# Patient Record
Sex: FEMALE | Race: ASIAN | NOT HISPANIC OR LATINO
[De-identification: names, ages, dates, MRNs, and addresses within clinical notes are randomized per-mention and may not be internally consistent; named-entity substitution may affect disease eponyms.]

---

## 2020-07-10 ENCOUNTER — APPOINTMENT (OUTPATIENT)
Dept: INTERNAL MEDICINE | Facility: CLINIC | Age: 35
End: 2020-07-10
Payer: MEDICAID

## 2020-07-10 VITALS
RESPIRATION RATE: 16 BRPM | HEIGHT: 59 IN | SYSTOLIC BLOOD PRESSURE: 141 MMHG | OXYGEN SATURATION: 99 % | BODY MASS INDEX: 27.82 KG/M2 | HEART RATE: 107 BPM | TEMPERATURE: 98.3 F | WEIGHT: 138 LBS | DIASTOLIC BLOOD PRESSURE: 95 MMHG

## 2020-07-10 VITALS — SYSTOLIC BLOOD PRESSURE: 125 MMHG | DIASTOLIC BLOOD PRESSURE: 87 MMHG

## 2020-07-10 DIAGNOSIS — Z82.49 FAMILY HISTORY OF ISCHEMIC HEART DISEASE AND OTHER DISEASES OF THE CIRCULATORY SYSTEM: ICD-10-CM

## 2020-07-10 DIAGNOSIS — G47.00 INSOMNIA, UNSPECIFIED: ICD-10-CM

## 2020-07-10 DIAGNOSIS — F42.9 OBSESSIVE-COMPULSIVE DISORDER, UNSPECIFIED: ICD-10-CM

## 2020-07-10 DIAGNOSIS — Z00.00 ENCOUNTER FOR GENERAL ADULT MEDICAL EXAMINATION W/OUT ABNORMAL FINDINGS: ICD-10-CM

## 2020-07-10 PROCEDURE — 99202 OFFICE O/P NEW SF 15 MIN: CPT

## 2020-07-10 PROCEDURE — 99385 PREV VISIT NEW AGE 18-39: CPT | Mod: 25

## 2020-07-12 PROBLEM — F42.9 OCD (OBSESSIVE COMPULSIVE DISORDER): Status: ACTIVE | Noted: 2020-07-10

## 2020-07-12 PROBLEM — G47.00 INSOMNIA, UNSPECIFIED TYPE: Status: ACTIVE | Noted: 2020-07-10

## 2020-07-12 NOTE — PHYSICAL EXAM
[No Acute Distress] : no acute distress [Well Nourished] : well nourished [Well Developed] : well developed [Well-Appearing] : well-appearing [Normal Sclera/Conjunctiva] : normal sclera/conjunctiva [PERRL] : pupils equal round and reactive to light [EOMI] : extraocular movements intact [Normal Outer Ear/Nose] : the outer ears and nose were normal in appearance [Normal Oropharynx] : the oropharynx was normal [No JVD] : no jugular venous distention [No Lymphadenopathy] : no lymphadenopathy [Supple] : supple [No Respiratory Distress] : no respiratory distress  [No Accessory Muscle Use] : no accessory muscle use [Clear to Auscultation] : lungs were clear to auscultation bilaterally [Normal Rate] : normal rate  [Regular Rhythm] : with a regular rhythm [Normal S1, S2] : normal S1 and S2 [No Murmur] : no murmur heard [Pedal Pulses Present] : the pedal pulses are present [No Edema] : there was no peripheral edema [Soft] : abdomen soft [Non Tender] : non-tender [Non-distended] : non-distended [Normal Bowel Sounds] : normal bowel sounds [Normal Posterior Cervical Nodes] : no posterior cervical lymphadenopathy [Normal Anterior Cervical Nodes] : no anterior cervical lymphadenopathy [No CVA Tenderness] : no CVA  tenderness [No Spinal Tenderness] : no spinal tenderness [No Joint Swelling] : no joint swelling [Grossly Normal Strength/Tone] : grossly normal strength/tone [No Rash] : no rash [Coordination Grossly Intact] : coordination grossly intact [No Focal Deficits] : no focal deficits [Normal Gait] : normal gait [Speech Grossly Normal] : speech grossly normal [Memory Grossly Normal] : memory grossly normal [Normal Affect] : the affect was normal [Alert and Oriented x3] : oriented to person, place, and time [Normal Mood] : the mood was normal [Normal Insight/Judgement] : insight and judgment were intact

## 2020-07-12 NOTE — HEALTH RISK ASSESSMENT
[] : Yes [No] : In the past 12 months have you used drugs other than those required for medical reasons? No [0] : 2) Feeling down, depressed, or hopeless: Not at all (0) [Patient reported PAP Smear was abnormal] : Patient reported PAP Smear was abnormal [HIV test declined] : HIV test declined [Hepatitis C test declined] : Hepatitis C test declined [None] : None [With Significant Other] : lives with significant other [Unemployed] : unemployed [Significant Other] : lives with significant other [Sexually Active] : sexually active [Feels Safe at Home] : Feels safe at home [Fully functional (using the telephone, shopping, preparing meals, housekeeping, doing laundry, using] : Fully functional and needs no help or supervision to perform IADLs (using the telephone, shopping, preparing meals, housekeeping, doing laundry, using transportation, managing medications and managing finances) [Fully functional (bathing, dressing, toileting, transferring, walking, feeding)] : Fully functional (bathing, dressing, toileting, transferring, walking, feeding) [No falls in past year] : Patient reported no falls in the past year [de-identified] :  a pack every 2 weeks for 10 yrs  [[id number]] : 0 [Change in mental status noted] : No change in mental status noted [Language] : denies difficulty with language [Behavior] : denies difficulty with behavior [Learning/Retaining New Information] : denies difficulty learning/retaining new information [Handling Complex Tasks] : denies difficulty handling complex tasks [Reasoning] : denies difficulty with reasoning [Spatial Ability and Orientation] : denies difficulty with spatial ability and orientation [High Risk Behavior] : no high risk behavior [PapSmearDate] : 01/2019 [

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
## 2020-07-12 NOTE — REVIEW OF SYSTEMS
[Fever] : no fever [Chills] : no chills [Fatigue] : no fatigue [Discharge] : no discharge [Pain] : no pain [Earache] : no earache [Hearing Loss] : no hearing loss [Nasal Discharge] : no nasal discharge [Sore Throat] : no sore throat [Chest Pain] : no chest pain [Palpitations] : no palpitations [Leg Claudication] : no leg claudication [Shortness Of Breath] : no shortness of breath [Wheezing] : no wheezing [Cough] : no cough [Dyspnea on Exertion] : no dyspnea on exertion [Abdominal Pain] : no abdominal pain [Nausea] : no nausea [Constipation] : no constipation [Diarrhea] : diarrhea [Vomiting] : no vomiting [Heartburn] : no heartburn [Melena] : no melena [Dysuria] : no dysuria [Incontinence] : no incontinence [Hematuria] : no hematuria [Frequency] : no frequency [Joint Pain] : no joint pain [Joint Stiffness] : no joint stiffness [Joint Swelling] : no joint swelling [Muscle Pain] : no muscle pain [Itching] : no itching [Skin Rash] : no skin rash [Headache] : no headache [Dizziness] : no dizziness [Fainting] : no fainting [Memory Loss] : no memory loss [Unsteady Walking] : no ataxia [Suicidal] : not suicidal [Insomnia] : insomnia [Anxiety] : anxiety [Depression] : depression

## 2020-07-15 ENCOUNTER — TRANSCRIPTION ENCOUNTER (OUTPATIENT)
Age: 35
End: 2020-07-15

## 2020-07-21 ENCOUNTER — APPOINTMENT (OUTPATIENT)
Dept: NEPHROLOGY | Facility: CLINIC | Age: 35
End: 2020-07-21
Payer: COMMERCIAL

## 2020-07-21 VITALS
DIASTOLIC BLOOD PRESSURE: 91 MMHG | OXYGEN SATURATION: 96 % | HEART RATE: 97 BPM | SYSTOLIC BLOOD PRESSURE: 146 MMHG | HEIGHT: 59 IN | BODY MASS INDEX: 29.2 KG/M2 | WEIGHT: 144.84 LBS

## 2020-07-21 PROCEDURE — 99204 OFFICE O/P NEW MOD 45 MIN: CPT

## 2020-07-21 RX ORDER — CLOMIPRAMINE HYDROCHLORIDE 25 MG/1
25 CAPSULE ORAL
Qty: 14 | Refills: 0 | Status: DISCONTINUED | COMMUNITY
Start: 2020-04-08

## 2020-07-21 RX ORDER — CLOMIPRAMINE HYDROCHLORIDE 50 MG/1
50 CAPSULE ORAL
Qty: 90 | Refills: 0 | Status: DISCONTINUED | COMMUNITY
Start: 2020-04-22

## 2020-07-21 RX ORDER — FLUOXETINE HYDROCHLORIDE 10 MG/1
10 CAPSULE ORAL
Qty: 30 | Refills: 0 | Status: DISCONTINUED | COMMUNITY
Start: 2020-06-26

## 2020-07-23 ENCOUNTER — APPOINTMENT (OUTPATIENT)
Dept: VASCULAR SURGERY | Facility: CLINIC | Age: 35
End: 2020-07-23
Payer: COMMERCIAL

## 2020-07-23 ENCOUNTER — APPOINTMENT (OUTPATIENT)
Dept: VASCULAR SURGERY | Facility: CLINIC | Age: 35
End: 2020-07-23
Payer: MEDICAID

## 2020-07-23 VITALS
SYSTOLIC BLOOD PRESSURE: 145 MMHG | HEART RATE: 79 BPM | HEIGHT: 59 IN | BODY MASS INDEX: 29.03 KG/M2 | DIASTOLIC BLOOD PRESSURE: 97 MMHG | WEIGHT: 144 LBS

## 2020-07-23 PROCEDURE — 93986 DUP-SCAN HEMO COMPL UNI STD: CPT

## 2020-07-23 PROCEDURE — 99203 OFFICE O/P NEW LOW 30 MIN: CPT

## 2020-07-23 PROCEDURE — 93931 UPPER EXTREMITY STUDY: CPT | Mod: 59

## 2020-07-23 NOTE — HISTORY OF PRESENT ILLNESS
[FreeTextEntry1] : 34-year-old female with history of polycystic kidney disease presents to the office with worsening renal function.  Her most recent GFR is 8 cc/min.  She presents to the office for consultation regarding hemodialysis access.  She is right-handed

## 2020-07-23 NOTE — HISTORY OF PRESENT ILLNESS
[FreeTextEntry1] : referral for CKD 5/ ESRD \par \par 35 yo lady who had a hospitalization in 2015 and was incidentally found to have kidney disease( was told that her eGFR was 50% at that time). She states that she had a lot going on in her life and was unable to follow up till 2017 when she started seeing Dr. Brown De La Rosa at [facility]. \par \par Labs in 10/14/017-\par  USG - right kidney -  9.1 cm\par left kidney- 7.2 cm\par several dominant cysts\par winston negative\par c3,c4 normal\par cryo neg\par hep bsag- negative\par rf neg\par pla2r < 1:10\par up/c- 3.7\par \par she was told that her poor kidney function was secondary to polycystic kidneys. Her last visit with Dr. De La Rosa was about a year ago. after that she lost her insurance and was unable to follow up. she is currently following up now. \par \par last labs done on  6/26/2020- \par Creat 6.27 mg/dl \par eGFR - 8 ml/min \par BUN- 59 \par Glucose - 72 \par K- 3.9 \par Phos - 5.0 mg/dl \par albumin- 4.1 \par Hb- 11.0 \par urinalysis - 3+ protein \par 25 hydroxy Vitamin D- 12.5 \par Urine albumin/creat ratio 1683 mg/gm of creatinine \par uric acid- 7.6 \par \par She states that she is feeling very lethargic, is unable to focus on anything and gets short of breath on walking a short distance. \par \par ROS\par - No changes in urination, no blood in urine \par cvs- no chest pain, no palpitations \par general - lethargy, inability to concentrate, increased sleepiness \par all other systems reviewed in detail and were negative except as above

## 2020-07-23 NOTE — ASSESSMENT
[FreeTextEntry1] : 35 yo lady with CKD 5 likely sec to ? polycystic kidney disease/ one atrophic kidney from unknown cause\par \par she has had CKD for the last 5 years and is now having uremic symptoms. \par discussed at length about dialysis options including home therapies/ in-center and transplant \par she stated that she has 2 cats at home and expressed that home therapy may not be the best option\par I spoke to Dr. Gomes- will set up an appointment for Thursday 7/23/20 for vein mapping and will try to get a permcath and fistula placed at the same time. \par \par Check renal panel, PTH \par Check HepB/C serologies \par Quantiferon Gold \par \par Anemia of CKD - last Hb 11.0. No DIAZ needed. \par \par Metabolic acidosis \par \par

## 2020-07-23 NOTE — PHYSICAL EXAM
[Normal Breath Sounds] : Normal breath sounds [2+] : left 2+ [No Rash or Lesion] : No rash or lesion [Calm] : calm [Alert] : alert [JVD] : no jugular venous distention  [Ankle Swelling (On Exam)] : not present [Varicose Veins Of Lower Extremities] : not present [] : not present [Abdomen Tenderness] : ~T ~M No abdominal tenderness [Skin Ulcer] : no ulcer [de-identified] : Appears well

## 2020-07-23 NOTE — PHYSICAL EXAM
[General Appearance - Alert] : alert [General Appearance - Well Nourished] : well nourished [General Appearance - In No Acute Distress] : in no acute distress [Sclera] : the sclera and conjunctiva were normal [Hearing Threshold Finger Rub Not [place]] : hearing was normal [Extraocular Movements] : extraocular movements were intact [Neck Appearance] : the appearance of the neck was normal [Examination Of The Oral Cavity] : the lips and gums were normal [Neck Cervical Mass (___cm)] : no neck mass was observed [Exaggerated Use Of Accessory Muscles For Inspiration] : no accessory muscle use [Thyroid Diffuse Enlargement] : the thyroid was not enlarged [Heart Sounds] : normal S1 and S2 [Heart Rate And Rhythm] : heart rate was normal and rhythm regular [Auscultation Breath Sounds / Voice Sounds] : lungs were clear to auscultation bilaterally [Bowel Sounds] : normal bowel sounds [No CVA Tenderness] : no ~M costovertebral angle tenderness [Abdomen Tenderness] : non-tender [Abdomen Soft] : soft [Abnormal Walk] : normal gait [Nail Clubbing] : no clubbing  or cyanosis of the fingernails [No Spinal Tenderness] : no spinal tenderness [] : no rash [Musculoskeletal - Swelling] : no joint swelling seen [Skin Lesions] : no skin lesions [Skin Color & Pigmentation] : normal skin color and pigmentation [No Focal Deficits] : no focal deficits [Cranial Nerves] : cranial nerves 2-12 were intact [Sensation] : the sensory exam was normal to light touch and pinprick [Oriented To Time, Place, And Person] : oriented to person, place, and time [Impaired Insight] : insight and judgment were intact [Affect] : the affect was normal [Mood] : the mood was normal [FreeTextEntry1] : 1+ edema

## 2020-07-23 NOTE — PROCEDURE
[FreeTextEntry1] : Patient underwent a left arm vein mapping which shows an adequate cephalic vein beginning at the left wrist.  In addition, I previous discussed this patient with nephrology and at the time of fistula creation will place a permacath.  Patient will be scheduled in the near future.

## 2020-08-12 ENCOUNTER — OUTPATIENT (OUTPATIENT)
Dept: OUTPATIENT SERVICES | Facility: HOSPITAL | Age: 35
LOS: 1 days | End: 2020-08-12
Payer: COMMERCIAL

## 2020-08-12 VITALS
HEIGHT: 59 IN | WEIGHT: 139.99 LBS | TEMPERATURE: 98 F | SYSTOLIC BLOOD PRESSURE: 142 MMHG | DIASTOLIC BLOOD PRESSURE: 87 MMHG | RESPIRATION RATE: 16 BRPM | OXYGEN SATURATION: 97 % | HEART RATE: 74 BPM

## 2020-08-12 DIAGNOSIS — Z87.81 PERSONAL HISTORY OF (HEALED) TRAUMATIC FRACTURE: Chronic | ICD-10-CM

## 2020-08-12 DIAGNOSIS — Q61.3 POLYCYSTIC KIDNEY, UNSPECIFIED: ICD-10-CM

## 2020-08-12 DIAGNOSIS — Z01.818 ENCOUNTER FOR OTHER PREPROCEDURAL EXAMINATION: ICD-10-CM

## 2020-08-12 DIAGNOSIS — Z87.59 PERSONAL HISTORY OF OTHER COMPLICATIONS OF PREGNANCY, CHILDBIRTH AND THE PUERPERIUM: Chronic | ICD-10-CM

## 2020-08-12 PROCEDURE — G0463: CPT

## 2020-08-12 RX ORDER — CEFAZOLIN SODIUM 1 G
2000 VIAL (EA) INJECTION ONCE
Refills: 0 | Status: DISCONTINUED | OUTPATIENT
Start: 2020-08-19 | End: 2020-09-03

## 2020-08-12 NOTE — H&P PST ADULT - NSICDXPASTMEDICALHX_GEN_ALL_CORE_FT
PAST MEDICAL HISTORY:  Attention deficit hyperactivity disorder (ADHD)     OCD (obsessive compulsive disorder)     Polycystic kidney disease

## 2020-08-12 NOTE — H&P PST ADULT - HISTORY OF PRESENT ILLNESS
This is 34 year old female with  past medical histsory of polycystic kidney disease initially diagnosed 2015, ESRD,        ** Scheeudlf for cOvid test in 8/ This is 35 year old female with  past medical history of polycystic kidney disease initially diagnosed 2015, ESRD, h/o OCD and ADHD (currently denies any suicidal ideations at this time).  Pt reports worsening generalized fatigue, latest creat 6.3 and GFR 8. Pt is now scheduled for creation of left radiocephalic AV fistula with insertion of permacath on 8/19 with Dr. Gomes.     ** Scheduled for Covid test in 8/17/20

## 2020-08-12 NOTE — H&P PST ADULT - NEUROLOGICAL
details… detailed exam negative Ftsg Text: The defect edges were debeveled with a #15 scalpel blade.  Given the location of the defect, shape of the defect and the proximity to free margins a full thickness skin graft was deemed most appropriate.  Using a sterile surgical marker, the primary defect shape was transferred to the donor site. The area thus outlined was incised deep to adipose tissue with a #15 scalpel blade.  The harvested graft was then trimmed of adipose tissue until only dermis and epidermis was left.  The skin margins of the secondary defect were undermined to an appropriate distance in all directions utilizing iris scissors.  The secondary defect was closed with interrupted buried subcutaneous sutures.  The skin edges were then re-apposed with running  sutures.  The skin graft was then placed in the primary defect and oriented appropriately.

## 2020-08-12 NOTE — H&P PST ADULT - NSICDXPROBLEM_GEN_ALL_CORE_FT
PROBLEM DIAGNOSES  Problem: Polycystic kidney disease  Assessment and Plan: Scheduled for creation left radiocephalic AV fistula with insertion of permacath on 8/19/20  Preop instructions provided. Chlorohexidine soap given  Labs CBC, BMP in chart  Appt for Covid 8/17/20  DOS UCG and serum K+

## 2020-08-14 ENCOUNTER — NON-APPOINTMENT (OUTPATIENT)
Age: 35
End: 2020-08-14

## 2020-08-14 ENCOUNTER — APPOINTMENT (OUTPATIENT)
Dept: INTERNAL MEDICINE | Facility: CLINIC | Age: 35
End: 2020-08-14
Payer: MEDICAID

## 2020-08-14 ENCOUNTER — OUTPATIENT (OUTPATIENT)
Dept: OUTPATIENT SERVICES | Facility: HOSPITAL | Age: 35
LOS: 1 days | End: 2020-08-14
Payer: COMMERCIAL

## 2020-08-14 VITALS
HEART RATE: 89 BPM | BODY MASS INDEX: 28.63 KG/M2 | HEIGHT: 59 IN | RESPIRATION RATE: 16 BRPM | TEMPERATURE: 98.2 F | SYSTOLIC BLOOD PRESSURE: 133 MMHG | WEIGHT: 142 LBS | DIASTOLIC BLOOD PRESSURE: 88 MMHG | OXYGEN SATURATION: 99 %

## 2020-08-14 DIAGNOSIS — N18.6 END STAGE RENAL DISEASE: ICD-10-CM

## 2020-08-14 DIAGNOSIS — Z87.81 PERSONAL HISTORY OF (HEALED) TRAUMATIC FRACTURE: Chronic | ICD-10-CM

## 2020-08-14 DIAGNOSIS — Z87.59 PERSONAL HISTORY OF OTHER COMPLICATIONS OF PREGNANCY, CHILDBIRTH AND THE PUERPERIUM: Chronic | ICD-10-CM

## 2020-08-14 DIAGNOSIS — E03.9 HYPOTHYROIDISM, UNSPECIFIED: ICD-10-CM

## 2020-08-14 DIAGNOSIS — Z01.818 ENCOUNTER FOR OTHER PREPROCEDURAL EXAMINATION: ICD-10-CM

## 2020-08-14 PROBLEM — F42.9 OBSESSIVE-COMPULSIVE DISORDER, UNSPECIFIED: Chronic | Status: ACTIVE | Noted: 2020-08-12

## 2020-08-14 PROBLEM — Q61.3 POLYCYSTIC KIDNEY, UNSPECIFIED: Chronic | Status: ACTIVE | Noted: 2020-08-12

## 2020-08-14 PROBLEM — F90.9 ATTENTION-DEFICIT HYPERACTIVITY DISORDER, UNSPECIFIED TYPE: Chronic | Status: ACTIVE | Noted: 2020-08-12

## 2020-08-14 PROCEDURE — 93000 ELECTROCARDIOGRAM COMPLETE: CPT

## 2020-08-14 PROCEDURE — 71046 X-RAY EXAM CHEST 2 VIEWS: CPT

## 2020-08-14 PROCEDURE — 71046 X-RAY EXAM CHEST 2 VIEWS: CPT | Mod: 26

## 2020-08-14 PROCEDURE — 99213 OFFICE O/P EST LOW 20 MIN: CPT | Mod: 25

## 2020-08-14 NOTE — PHYSICAL EXAM
[Well Nourished] : well nourished [No Acute Distress] : no acute distress [Well Developed] : well developed [Well-Appearing] : well-appearing [PERRL] : pupils equal round and reactive to light [Normal Sclera/Conjunctiva] : normal sclera/conjunctiva [EOMI] : extraocular movements intact [Normal Outer Ear/Nose] : the outer ears and nose were normal in appearance [Normal Oropharynx] : the oropharynx was normal [No JVD] : no jugular venous distention [No Lymphadenopathy] : no lymphadenopathy [Supple] : supple [No Respiratory Distress] : no respiratory distress  [Clear to Auscultation] : lungs were clear to auscultation bilaterally [No Accessory Muscle Use] : no accessory muscle use [Regular Rhythm] : with a regular rhythm [Normal Rate] : normal rate  [No Murmur] : no murmur heard [Normal S1, S2] : normal S1 and S2 [No Edema] : there was no peripheral edema [Pedal Pulses Present] : the pedal pulses are present [Non Tender] : non-tender [Soft] : abdomen soft [Non-distended] : non-distended [Normal Bowel Sounds] : normal bowel sounds [Normal Anterior Cervical Nodes] : no anterior cervical lymphadenopathy [Normal Posterior Cervical Nodes] : no posterior cervical lymphadenopathy [No CVA Tenderness] : no CVA  tenderness [No Spinal Tenderness] : no spinal tenderness [No Joint Swelling] : no joint swelling [No Rash] : no rash [Grossly Normal Strength/Tone] : grossly normal strength/tone [Coordination Grossly Intact] : coordination grossly intact [Speech Grossly Normal] : speech grossly normal [Normal Gait] : normal gait [No Focal Deficits] : no focal deficits [Memory Grossly Normal] : memory grossly normal [Normal Affect] : the affect was normal [Normal Mood] : the mood was normal [Alert and Oriented x3] : oriented to person, place, and time [Normal Insight/Judgement] : insight and judgment were intact

## 2020-08-17 ENCOUNTER — APPOINTMENT (OUTPATIENT)
Dept: DISASTER EMERGENCY | Facility: CLINIC | Age: 35
End: 2020-08-17

## 2020-08-17 PROBLEM — Z01.818 PREOPERATIVE CLEARANCE: Status: ACTIVE | Noted: 2020-08-14

## 2020-08-17 PROBLEM — E03.9 SUBCLINICAL HYPOTHYROIDISM: Status: RESOLVED | Noted: 2020-07-10 | Resolved: 2020-08-17

## 2020-08-17 LAB
ANION GAP SERPL CALC-SCNC: 14 MMOL/L
APTT BLD: 39.8 SEC
BASOPHILS # BLD AUTO: 0.08 K/UL
BASOPHILS NFR BLD AUTO: 0.8 %
BUN SERPL-MCNC: 71 MG/DL
CALCIUM SERPL-MCNC: 8.4 MG/DL
CHLORIDE SERPL-SCNC: 108 MMOL/L
CO2 SERPL-SCNC: 18 MMOL/L
CREAT SERPL-MCNC: 6.6 MG/DL
EOSINOPHIL # BLD AUTO: 0.42 K/UL
EOSINOPHIL NFR BLD AUTO: 4.3 %
GLUCOSE SERPL-MCNC: 87 MG/DL
HCT VFR BLD CALC: 32.1 %
HGB BLD-MCNC: 9.8 G/DL
IMM GRANULOCYTES NFR BLD AUTO: 0.2 %
INR PPP: 0.92 RATIO
LYMPHOCYTES # BLD AUTO: 2.18 K/UL
LYMPHOCYTES NFR BLD AUTO: 22.5 %
MAN DIFF?: NORMAL
MCHC RBC-ENTMCNC: 28.3 PG
MCHC RBC-ENTMCNC: 30.5 GM/DL
MCV RBC AUTO: 92.8 FL
MONOCYTES # BLD AUTO: 0.48 K/UL
MONOCYTES NFR BLD AUTO: 5 %
NEUTROPHILS # BLD AUTO: 6.5 K/UL
NEUTROPHILS NFR BLD AUTO: 67.2 %
PLATELET # BLD AUTO: 253 K/UL
POTASSIUM SERPL-SCNC: 4.6 MMOL/L
PT BLD: 10.9 SEC
RBC # BLD: 3.46 M/UL
RBC # FLD: 14.8 %
SODIUM SERPL-SCNC: 141 MMOL/L
T3 SERPL-MCNC: 89 NG/DL
T4 FREE SERPL-MCNC: 0.7 NG/DL
TSH SERPL-ACNC: 5.95 UIU/ML
WBC # FLD AUTO: 9.68 K/UL

## 2020-08-17 NOTE — REVIEW OF SYSTEMS
[Insomnia] : insomnia [Anxiety] : anxiety [Depression] : depression [Chills] : no chills [Fever] : no fever [Fatigue] : no fatigue [Pain] : no pain [Discharge] : no discharge [Earache] : no earache [Hearing Loss] : no hearing loss [Nasal Discharge] : no nasal discharge [Sore Throat] : no sore throat [Leg Claudication] : no leg claudication [Palpitations] : no palpitations [Chest Pain] : no chest pain [Wheezing] : no wheezing [Shortness Of Breath] : no shortness of breath [Cough] : no cough [Abdominal Pain] : no abdominal pain [Dyspnea on Exertion] : no dyspnea on exertion [Nausea] : no nausea [Constipation] : no constipation [Diarrhea] : diarrhea [Vomiting] : no vomiting [Heartburn] : no heartburn [Melena] : no melena [Dysuria] : no dysuria [Incontinence] : no incontinence [Hematuria] : no hematuria [Frequency] : no frequency [Joint Pain] : no joint pain [Joint Stiffness] : no joint stiffness [Joint Swelling] : no joint swelling [Muscle Pain] : no muscle pain [Itching] : no itching [Skin Rash] : no skin rash [Headache] : no headache [Dizziness] : no dizziness [Fainting] : no fainting [Memory Loss] : no memory loss [Unsteady Walking] : no ataxia [Suicidal] : not suicidal

## 2020-08-17 NOTE — HEALTH RISK ASSESSMENT
[] : Yes [No falls in past year] : Patient reported no falls in the past year [No] : In the past 12 months have you used drugs other than those required for medical reasons? No [0] : 2) Feeling down, depressed, or hopeless: Not at all (0) [Patient reported PAP Smear was abnormal] : Patient reported PAP Smear was abnormal [HIV test declined] : HIV test declined [Hepatitis C test declined] : Hepatitis C test declined [With Significant Other] : lives with significant other [None] : None [Significant Other] : lives with significant other [Sexually Active] : sexually active [Unemployed] : unemployed [Feels Safe at Home] : Feels safe at home [Fully functional (using the telephone, shopping, preparing meals, housekeeping, doing laundry, using] : Fully functional and needs no help or supervision to perform IADLs (using the telephone, shopping, preparing meals, housekeeping, doing laundry, using transportation, managing medications and managing finances) [Fully functional (bathing, dressing, toileting, transferring, walking, feeding)] : Fully functional (bathing, dressing, toileting, transferring, walking, feeding) [de-identified] :  a pack every 2 weeks for 10 yrs  [[id number]] : 0 [Change in mental status noted] : No change in mental status noted [Language] : denies difficulty with language [Behavior] : denies difficulty with behavior [Learning/Retaining New Information] : denies difficulty learning/retaining new information [Handling Complex Tasks] : denies difficulty handling complex tasks [Reasoning] : denies difficulty with reasoning [Spatial Ability and Orientation] : denies difficulty with spatial ability and orientation [High Risk Behavior] : no high risk behavior [PapSmearDate] : 01/2019 [

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
## 2020-08-17 NOTE — ASSESSMENT
[Patient Optimized for Surgery] : Patient optimized for surgery [FreeTextEntry4] : EKG in 08/14/2020 with sinus rithem and non sprcific t wave inverstion;  CXR in 08/14/2020 reviewed with grossly normal results labs done in 08/14/2020 reviewed MEts > 4  revised cardiac index =2; class 3 risk;  high risk patient medically optimized for high risk procedural; perioperative anemia monitor and blood transfusion per surgery team   [FreeTextEntry1] : had STD test done in 2019 with gyn\par obtain records

## 2020-08-18 ENCOUNTER — TRANSCRIPTION ENCOUNTER (OUTPATIENT)
Age: 35
End: 2020-08-18

## 2020-08-19 ENCOUNTER — OUTPATIENT (OUTPATIENT)
Dept: OUTPATIENT SERVICES | Facility: HOSPITAL | Age: 35
LOS: 1 days | End: 2020-08-19
Payer: COMMERCIAL

## 2020-08-19 ENCOUNTER — APPOINTMENT (OUTPATIENT)
Dept: VASCULAR SURGERY | Facility: HOSPITAL | Age: 35
End: 2020-08-19

## 2020-08-19 VITALS
DIASTOLIC BLOOD PRESSURE: 70 MMHG | OXYGEN SATURATION: 100 % | HEART RATE: 66 BPM | TEMPERATURE: 97 F | SYSTOLIC BLOOD PRESSURE: 142 MMHG | RESPIRATION RATE: 14 BRPM

## 2020-08-19 VITALS
DIASTOLIC BLOOD PRESSURE: 83 MMHG | RESPIRATION RATE: 18 BRPM | WEIGHT: 139.99 LBS | HEIGHT: 59 IN | SYSTOLIC BLOOD PRESSURE: 130 MMHG | TEMPERATURE: 99 F | HEART RATE: 86 BPM | OXYGEN SATURATION: 100 %

## 2020-08-19 DIAGNOSIS — Q61.3 POLYCYSTIC KIDNEY, UNSPECIFIED: ICD-10-CM

## 2020-08-19 DIAGNOSIS — Z87.81 PERSONAL HISTORY OF (HEALED) TRAUMATIC FRACTURE: Chronic | ICD-10-CM

## 2020-08-19 DIAGNOSIS — Z87.59 PERSONAL HISTORY OF OTHER COMPLICATIONS OF PREGNANCY, CHILDBIRTH AND THE PUERPERIUM: Chronic | ICD-10-CM

## 2020-08-19 LAB
HCG UR QL: NEGATIVE — SIGNIFICANT CHANGE UP
POTASSIUM SERPL-MCNC: 3.8 MMOL/L — SIGNIFICANT CHANGE UP (ref 3.5–5.3)
POTASSIUM SERPL-SCNC: 3.8 MMOL/L — SIGNIFICANT CHANGE UP (ref 3.5–5.3)

## 2020-08-19 PROCEDURE — 36558 INSERT TUNNELED CV CATH: CPT | Mod: RT

## 2020-08-19 PROCEDURE — 77001 FLUOROGUIDE FOR VEIN DEVICE: CPT | Mod: 26

## 2020-08-19 PROCEDURE — C1894: CPT

## 2020-08-19 PROCEDURE — 76000 FLUOROSCOPY <1 HR PHYS/QHP: CPT

## 2020-08-19 PROCEDURE — 36820 AV FUSION/FOREARM VEIN: CPT | Mod: LT

## 2020-08-19 PROCEDURE — C1889: CPT

## 2020-08-19 PROCEDURE — 81025 URINE PREGNANCY TEST: CPT

## 2020-08-19 PROCEDURE — 36558 INSERT TUNNELED CV CATH: CPT

## 2020-08-19 PROCEDURE — C1750: CPT

## 2020-08-19 PROCEDURE — 84132 ASSAY OF SERUM POTASSIUM: CPT

## 2020-08-19 RX ORDER — SODIUM BICARBONATE 1 MEQ/ML
1 SYRINGE (ML) INTRAVENOUS
Qty: 0 | Refills: 0 | DISCHARGE

## 2020-08-19 RX ORDER — ONDANSETRON 8 MG/1
4 TABLET, FILM COATED ORAL EVERY 4 HOURS
Refills: 0 | Status: DISCONTINUED | OUTPATIENT
Start: 2020-08-19 | End: 2020-08-19

## 2020-08-19 RX ORDER — LIDOCAINE HCL 20 MG/ML
0.2 VIAL (ML) INJECTION ONCE
Refills: 0 | Status: DISCONTINUED | OUTPATIENT
Start: 2020-08-19 | End: 2020-08-19

## 2020-08-19 RX ORDER — OXYCODONE HYDROCHLORIDE 5 MG/1
1 TABLET ORAL
Qty: 6 | Refills: 0
Start: 2020-08-19

## 2020-08-19 RX ORDER — CHLORHEXIDINE GLUCONATE 213 G/1000ML
1 SOLUTION TOPICAL ONCE
Refills: 0 | Status: DISCONTINUED | OUTPATIENT
Start: 2020-08-19 | End: 2020-08-19

## 2020-08-19 RX ORDER — FENTANYL CITRATE 50 UG/ML
50 INJECTION INTRAVENOUS
Refills: 0 | Status: DISCONTINUED | OUTPATIENT
Start: 2020-08-19 | End: 2020-08-19

## 2020-08-19 RX ORDER — FLUOXETINE HCL 10 MG
10 CAPSULE ORAL DAILY
Refills: 0 | Status: DISCONTINUED | OUTPATIENT
Start: 2020-08-19 | End: 2020-08-19

## 2020-08-19 RX ORDER — SODIUM BICARBONATE 1 MEQ/ML
650 SYRINGE (ML) INTRAVENOUS
Refills: 0 | Status: DISCONTINUED | OUTPATIENT
Start: 2020-08-19 | End: 2020-08-19

## 2020-08-19 RX ORDER — SODIUM CHLORIDE 9 MG/ML
3 INJECTION INTRAMUSCULAR; INTRAVENOUS; SUBCUTANEOUS EVERY 8 HOURS
Refills: 0 | Status: DISCONTINUED | OUTPATIENT
Start: 2020-08-19 | End: 2020-08-19

## 2020-08-19 RX ORDER — ZOLPIDEM TARTRATE 10 MG/1
10 TABLET ORAL AT BEDTIME
Refills: 0 | Status: DISCONTINUED | OUTPATIENT
Start: 2020-08-19 | End: 2020-08-19

## 2020-08-19 RX ORDER — FENTANYL CITRATE 50 UG/ML
25 INJECTION INTRAVENOUS
Refills: 0 | Status: DISCONTINUED | OUTPATIENT
Start: 2020-08-19 | End: 2020-08-19

## 2020-08-19 NOTE — PRE-ANESTHESIA EVALUATION ADULT - NSANTHPMHFT_GEN_ALL_CORE
35 F w/ ESRD from PCKD w/ progressive worsening of renal function, OCD/ ADHD. Daily tobacco and marijuana use

## 2020-08-19 NOTE — ASU PATIENT PROFILE, ADULT - PMH
Attention deficit hyperactivity disorder (ADHD)    OCD (obsessive compulsive disorder)    Polycystic kidney disease

## 2020-08-19 NOTE — ASU DISCHARGE PLAN (ADULT/PEDIATRIC) - CALL YOUR DOCTOR IF YOU HAVE ANY OF THE FOLLOWING:
Bleeding that does not stop/Numbness, tingling, color or temperature change to extremity/Swelling that gets worse/Pain not relieved by Medications/Fever greater than (need to indicate Fahrenheit or Celsius)/Wound/Surgical Site with redness, or foul smelling discharge or pus

## 2020-08-19 NOTE — BRIEF OPERATIVE NOTE - OPERATION/FINDINGS
Procedure: Left radiocephalic fistula creation; right IJ permacath placement; Findings: permacath placed under fluoro guidance no PTX at end of procedure

## 2020-08-31 ENCOUNTER — APPOINTMENT (OUTPATIENT)
Dept: NEPHROLOGY | Facility: CLINIC | Age: 35
End: 2020-08-31
Payer: COMMERCIAL

## 2020-08-31 VITALS
HEIGHT: 59 IN | HEART RATE: 77 BPM | WEIGHT: 147.71 LBS | SYSTOLIC BLOOD PRESSURE: 130 MMHG | BODY MASS INDEX: 29.78 KG/M2 | OXYGEN SATURATION: 99 % | DIASTOLIC BLOOD PRESSURE: 100 MMHG

## 2020-08-31 DIAGNOSIS — E83.39 OTHER DISORDERS OF PHOSPHORUS METABOLISM: ICD-10-CM

## 2020-08-31 DIAGNOSIS — E87.2 ACIDOSIS: ICD-10-CM

## 2020-08-31 PROCEDURE — 99215 OFFICE O/P EST HI 40 MIN: CPT

## 2020-08-31 RX ORDER — OXYCODONE 5 MG/1
5 TABLET ORAL
Qty: 6 | Refills: 0 | Status: DISCONTINUED | COMMUNITY
Start: 2020-08-19

## 2020-08-31 NOTE — ASSESSMENT
[FreeTextEntry1] : 36 yo lady with CKD 5 likely sec to ? polycystic kidney disease/ one atrophic kidney from unknown cause\par \par she has had CKD for the last 5 years and is now having uremic symptoms. discussed at length about dialysis options including home therapies/ in-center and transplant. she stated that she has 2 cats at home and expressed that home therapy may not be the best option. she expressed interest in in-center hemodialysis. we have been working on insurance approval for the dialysis facilities. 8/19- she had a left upper arm AV fistula and a right Permcath placed. \par \par she is currently uremic and considering that there is a problem with insurance approval, it would be best to initiate dialysis in the hospital. she has an appointment with Dr. Gomes tomorrow and after that , she will be going to the hospital. \par \par she just had labs done on 8/14/20. would not repeat labs. \par

## 2020-08-31 NOTE — PHYSICAL EXAM
[General Appearance - Alert] : alert [General Appearance - In No Acute Distress] : in no acute distress [General Appearance - Well Nourished] : well nourished [Sclera] : the sclera and conjunctiva were normal [Extraocular Movements] : extraocular movements were intact [Hearing Threshold Finger Rub Not [place]] : hearing was normal [Examination Of The Oral Cavity] : the lips and gums were normal [Neck Appearance] : the appearance of the neck was normal [Neck Cervical Mass (___cm)] : no neck mass was observed [Thyroid Diffuse Enlargement] : the thyroid was not enlarged [Exaggerated Use Of Accessory Muscles For Inspiration] : no accessory muscle use [Auscultation Breath Sounds / Voice Sounds] : lungs were clear to auscultation bilaterally [Heart Rate And Rhythm] : heart rate was normal and rhythm regular [Heart Sounds] : normal S1 and S2 [FreeTextEntry1] : 1+ edema [Bowel Sounds] : normal bowel sounds [Abdomen Soft] : soft [Abdomen Tenderness] : non-tender [No CVA Tenderness] : no ~M costovertebral angle tenderness [No Spinal Tenderness] : no spinal tenderness [Abnormal Walk] : normal gait [Nail Clubbing] : no clubbing  or cyanosis of the fingernails [Musculoskeletal - Swelling] : no joint swelling seen [Skin Color & Pigmentation] : normal skin color and pigmentation [] : no rash [Skin Lesions] : no skin lesions [Cranial Nerves] : cranial nerves 2-12 were intact [Sensation] : the sensory exam was normal to light touch and pinprick [No Focal Deficits] : no focal deficits [Oriented To Time, Place, And Person] : oriented to person, place, and time [Impaired Insight] : insight and judgment were intact [Affect] : the affect was normal [Mood] : the mood was normal

## 2020-08-31 NOTE — HISTORY OF PRESENT ILLNESS
[FreeTextEntry1] : referral for CKD 5/ ESRD \par \par 36 yo lady who had a hospitalization in 2015 and was incidentally found to have kidney disease( was told that her eGFR was 50% at that time). She states that she had a lot going on in her life and was unable to follow up till 2017 when she started seeing Dr. Brown De La Rosa at [facility]. \par \par Labs in 10/14/017-\par  USG - right kidney -  9.1 cm\par left kidney- 7.2 cm\par several dominant cysts\par winston negative\par c3,c4 normal\par cryo neg\par hep bsag- negative\par rf neg\par pla2r < 1:10\par up/c- 3.7\par \par she was told that her poor kidney function was secondary to polycystic kidneys. Her last visit with Dr. De La Rosa was about a year ago. after that she lost her insurance and was unable to follow up. she is currently following up now. \par \par last labs done on  6/26/2020- \par Creat 6.27 mg/dl \par eGFR - 8 ml/min \par BUN- 59 \par Glucose - 72 \par K- 3.9 \par Phos - 5.0 mg/dl \par albumin- 4.1 \par Hb- 11.0 \par urinalysis - 3+ protein \par 25 hydroxy Vitamin D- 12.5 \par Urine albumin/creat ratio 1683 mg/gm of creatinine \par uric acid- 7.6 \par \par She states that she is feeling very lethargic, is unable to focus on anything and gets short of breath on walking a short distance. \par \par since her last visit, we have been working on her insurance approval for placement into a dialysis unit. she saw Dr. Gomes on 8/19 and had a left radiocephalic fistula placed. she also had a right sided permcath placed. \par \par overall she has not been feeling well. she is lethargic and is barely able to do anything around the house. she does not have a good appetite. she feels like her urination has reduced and that her weight is increasing. she remains very anxious about starting dialysis. \par \par ros \par cvs- no chest pain, no shortness of breath \par gu- no blood in urine, no foamy urine \par all other systems reviewed in detail and were negative except as above

## 2020-09-01 ENCOUNTER — APPOINTMENT (OUTPATIENT)
Dept: VASCULAR SURGERY | Facility: CLINIC | Age: 35
End: 2020-09-01
Payer: COMMERCIAL

## 2020-09-01 ENCOUNTER — INPATIENT (INPATIENT)
Facility: HOSPITAL | Age: 35
LOS: 2 days | Discharge: HOME | DRG: 683 | End: 2020-09-04
Attending: HOSPITALIST | Admitting: STUDENT IN AN ORGANIZED HEALTH CARE EDUCATION/TRAINING PROGRAM
Payer: COMMERCIAL

## 2020-09-01 VITALS
DIASTOLIC BLOOD PRESSURE: 87 MMHG | RESPIRATION RATE: 20 BRPM | WEIGHT: 145.06 LBS | HEART RATE: 81 BPM | HEIGHT: 59 IN | TEMPERATURE: 98 F | SYSTOLIC BLOOD PRESSURE: 133 MMHG | OXYGEN SATURATION: 99 %

## 2020-09-01 VITALS — TEMPERATURE: 97.3 F

## 2020-09-01 DIAGNOSIS — N25.0 RENAL OSTEODYSTROPHY: ICD-10-CM

## 2020-09-01 DIAGNOSIS — Z02.9 ENCOUNTER FOR ADMINISTRATIVE EXAMINATIONS, UNSPECIFIED: ICD-10-CM

## 2020-09-01 DIAGNOSIS — Z87.81 PERSONAL HISTORY OF (HEALED) TRAUMATIC FRACTURE: Chronic | ICD-10-CM

## 2020-09-01 DIAGNOSIS — E87.2 ACIDOSIS: ICD-10-CM

## 2020-09-01 DIAGNOSIS — F42.9 OBSESSIVE-COMPULSIVE DISORDER, UNSPECIFIED: ICD-10-CM

## 2020-09-01 DIAGNOSIS — D50.0 IRON DEFICIENCY ANEMIA SECONDARY TO BLOOD LOSS (CHRONIC): ICD-10-CM

## 2020-09-01 DIAGNOSIS — Z29.9 ENCOUNTER FOR PROPHYLACTIC MEASURES, UNSPECIFIED: ICD-10-CM

## 2020-09-01 DIAGNOSIS — N18.5 CHRONIC KIDNEY DISEASE, STAGE 5: ICD-10-CM

## 2020-09-01 DIAGNOSIS — N18.6 END STAGE RENAL DISEASE: ICD-10-CM

## 2020-09-01 DIAGNOSIS — I77.0 ARTERIOVENOUS FISTULA, ACQUIRED: ICD-10-CM

## 2020-09-01 DIAGNOSIS — Q61.3 POLYCYSTIC KIDNEY, UNSPECIFIED: ICD-10-CM

## 2020-09-01 DIAGNOSIS — E03.9 HYPOTHYROIDISM, UNSPECIFIED: ICD-10-CM

## 2020-09-01 DIAGNOSIS — Z87.59 PERSONAL HISTORY OF OTHER COMPLICATIONS OF PREGNANCY, CHILDBIRTH AND THE PUERPERIUM: Chronic | ICD-10-CM

## 2020-09-01 DIAGNOSIS — I10 ESSENTIAL (PRIMARY) HYPERTENSION: ICD-10-CM

## 2020-09-01 LAB
25(OH)D3 SERPL-MCNC: 14.6 NG/ML
ALBUMIN SERPL ELPH-MCNC: 3.6 G/DL — SIGNIFICANT CHANGE UP (ref 3.3–5)
ALBUMIN SERPL ELPH-MCNC: 4.1 G/DL
ALP SERPL-CCNC: 107 U/L — SIGNIFICANT CHANGE UP (ref 40–120)
ALT FLD-CCNC: 8 U/L — LOW (ref 10–45)
ANION GAP SERPL CALC-SCNC: 15 MMOL/L
ANION GAP SERPL CALC-SCNC: 15 MMOL/L — SIGNIFICANT CHANGE UP (ref 5–17)
APPEARANCE: CLEAR
AST SERPL-CCNC: 21 U/L — SIGNIFICANT CHANGE UP (ref 10–40)
BACTERIA: NEGATIVE
BASE EXCESS BLDV CALC-SCNC: -10.7 MMOL/L — LOW (ref -2–2)
BASOPHILS # BLD AUTO: 0.07 K/UL
BASOPHILS # BLD AUTO: 0.07 K/UL — SIGNIFICANT CHANGE UP (ref 0–0.2)
BASOPHILS NFR BLD AUTO: 0.7 % — SIGNIFICANT CHANGE UP (ref 0–2)
BASOPHILS NFR BLD AUTO: 0.8 %
BILIRUB SERPL-MCNC: 0.2 MG/DL — SIGNIFICANT CHANGE UP (ref 0.2–1.2)
BILIRUBIN URINE: NEGATIVE
BLOOD URINE: NORMAL
BUN SERPL-MCNC: 58 MG/DL — HIGH (ref 7–23)
BUN SERPL-MCNC: 60 MG/DL
CA-I SERPL-SCNC: 1.17 MMOL/L — SIGNIFICANT CHANGE UP (ref 1.12–1.3)
CALCIUM SERPL-MCNC: 8.5 MG/DL — SIGNIFICANT CHANGE UP (ref 8.4–10.5)
CALCIUM SERPL-MCNC: 8.8 MG/DL
CALCIUM SERPL-MCNC: 8.8 MG/DL
CHLORIDE BLDV-SCNC: 119 MMOL/L — HIGH (ref 96–108)
CHLORIDE SERPL-SCNC: 113 MMOL/L
CHLORIDE SERPL-SCNC: 114 MMOL/L — HIGH (ref 96–108)
CO2 BLDV-SCNC: 16 MMOL/L — LOW (ref 22–30)
CO2 SERPL-SCNC: 14 MMOL/L
CO2 SERPL-SCNC: 14 MMOL/L — LOW (ref 22–31)
COLOR: NORMAL
CREAT SERPL-MCNC: 6.3 MG/DL
CREAT SERPL-MCNC: 7.3 MG/DL — HIGH (ref 0.5–1.3)
CREAT SPEC-SCNC: 73 MG/DL
CREAT/PROT UR: 2.7 RATIO
EOSINOPHIL # BLD AUTO: 0.42 K/UL — SIGNIFICANT CHANGE UP (ref 0–0.5)
EOSINOPHIL # BLD AUTO: 0.51 K/UL
EOSINOPHIL NFR BLD AUTO: 4.5 % — SIGNIFICANT CHANGE UP (ref 0–6)
EOSINOPHIL NFR BLD AUTO: 5.9 %
FERRITIN SERPL-MCNC: 23 NG/ML
GAS PNL BLDV: 141 MMOL/L — SIGNIFICANT CHANGE UP (ref 135–145)
GAS PNL BLDV: SIGNIFICANT CHANGE UP
GAS PNL BLDV: SIGNIFICANT CHANGE UP
GLUCOSE BLDV-MCNC: 93 MG/DL — SIGNIFICANT CHANGE UP (ref 70–99)
GLUCOSE QUALITATIVE U: NEGATIVE
GLUCOSE SERPL-MCNC: 101 MG/DL — HIGH (ref 70–99)
GLUCOSE SERPL-MCNC: 84 MG/DL
HBV CORE IGG+IGM SER QL: NONREACTIVE
HBV SURFACE AB SER QL: REACTIVE
HBV SURFACE AG SER QL: NONREACTIVE
HCG UR QL: NEGATIVE — SIGNIFICANT CHANGE UP
HCO3 BLDV-SCNC: 15 MMOL/L — LOW (ref 21–29)
HCT VFR BLD CALC: 26.8 % — LOW (ref 34.5–45)
HCT VFR BLD CALC: 32.9 %
HCT VFR BLDA CALC: 26 % — LOW (ref 39–50)
HCV AB SER QL: NONREACTIVE
HCV S/CO RATIO: 0.17 S/CO
HGB BLD CALC-MCNC: 8.5 G/DL — LOW (ref 11.5–15.5)
HGB BLD-MCNC: 10.3 G/DL
HGB BLD-MCNC: 8.2 G/DL — LOW (ref 11.5–15.5)
HYALINE CASTS: 0 /LPF
IMM GRANULOCYTES NFR BLD AUTO: 0.3 %
IMM GRANULOCYTES NFR BLD AUTO: 0.4 % — SIGNIFICANT CHANGE UP (ref 0–1.5)
IRON SATN MFR SERPL: 13 %
IRON SERPL-MCNC: 36 UG/DL
KETONES URINE: NEGATIVE
LACTATE BLDV-MCNC: 0.7 MMOL/L — SIGNIFICANT CHANGE UP (ref 0.7–2)
LEUKOCYTE ESTERASE URINE: NEGATIVE
LYMPHOCYTES # BLD AUTO: 2.46 K/UL — SIGNIFICANT CHANGE UP (ref 1–3.3)
LYMPHOCYTES # BLD AUTO: 2.57 K/UL
LYMPHOCYTES # BLD AUTO: 26.1 % — SIGNIFICANT CHANGE UP (ref 13–44)
LYMPHOCYTES NFR BLD AUTO: 29.8 %
M TB IFN-G BLD-IMP: NEGATIVE
MAN DIFF?: NORMAL
MCHC RBC-ENTMCNC: 28.1 PG — SIGNIFICANT CHANGE UP (ref 27–34)
MCHC RBC-ENTMCNC: 28.3 PG
MCHC RBC-ENTMCNC: 30.6 GM/DL — LOW (ref 32–36)
MCHC RBC-ENTMCNC: 31.3 GM/DL
MCV RBC AUTO: 90.4 FL
MCV RBC AUTO: 91.8 FL — SIGNIFICANT CHANGE UP (ref 80–100)
MICROSCOPIC-UA: NORMAL
MONOCYTES # BLD AUTO: 0.42 K/UL
MONOCYTES # BLD AUTO: 0.48 K/UL — SIGNIFICANT CHANGE UP (ref 0–0.9)
MONOCYTES NFR BLD AUTO: 4.9 %
MONOCYTES NFR BLD AUTO: 5.1 % — SIGNIFICANT CHANGE UP (ref 2–14)
NEUTROPHILS # BLD AUTO: 5.01 K/UL
NEUTROPHILS # BLD AUTO: 5.94 K/UL — SIGNIFICANT CHANGE UP (ref 1.8–7.4)
NEUTROPHILS NFR BLD AUTO: 58.3 %
NEUTROPHILS NFR BLD AUTO: 63.2 % — SIGNIFICANT CHANGE UP (ref 43–77)
NITRITE URINE: NEGATIVE
NRBC # BLD: 0 /100 WBCS — SIGNIFICANT CHANGE UP (ref 0–0)
PARATHYROID HORMONE INTACT: 1054 PG/ML
PCO2 BLDV: 37 MMHG — SIGNIFICANT CHANGE UP (ref 35–50)
PH BLDV: 7.24 — LOW (ref 7.35–7.45)
PH URINE: 6.5
PHOSPHATE SERPL-MCNC: 5.2 MG/DL
PLATELET # BLD AUTO: 283 K/UL
PLATELET # BLD AUTO: 291 K/UL — SIGNIFICANT CHANGE UP (ref 150–400)
PO2 BLDV: 26 MMHG — SIGNIFICANT CHANGE UP (ref 25–45)
POTASSIUM BLDV-SCNC: 4.2 MMOL/L — SIGNIFICANT CHANGE UP (ref 3.5–5.3)
POTASSIUM SERPL-MCNC: 4.3 MMOL/L — SIGNIFICANT CHANGE UP (ref 3.5–5.3)
POTASSIUM SERPL-SCNC: 4.1 MMOL/L
POTASSIUM SERPL-SCNC: 4.3 MMOL/L — SIGNIFICANT CHANGE UP (ref 3.5–5.3)
PROT SERPL-MCNC: 6.8 G/DL — SIGNIFICANT CHANGE UP (ref 6–8.3)
PROT UR-MCNC: 195 MG/DL
PROTEIN URINE: ABNORMAL
QUANTIFERON TB PLUS MITOGEN MINUS NIL: 3.24 IU/ML
QUANTIFERON TB PLUS NIL: 0.01 IU/ML
QUANTIFERON TB PLUS TB1 MINUS NIL: 0 IU/ML
QUANTIFERON TB PLUS TB2 MINUS NIL: 0 IU/ML
RBC # BLD: 2.92 M/UL — LOW (ref 3.8–5.2)
RBC # BLD: 3.64 M/UL
RBC # FLD: 15 %
RBC # FLD: 15.2 % — HIGH (ref 10.3–14.5)
RED BLOOD CELLS URINE: 1 /HPF
SAO2 % BLDV: 39 % — LOW (ref 67–88)
SARS-COV-2 RNA SPEC QL NAA+PROBE: SIGNIFICANT CHANGE UP
SODIUM SERPL-SCNC: 143 MMOL/L
SODIUM SERPL-SCNC: 143 MMOL/L — SIGNIFICANT CHANGE UP (ref 135–145)
SPECIFIC GRAVITY URINE: 1.01
SQUAMOUS EPITHELIAL CELLS: 2 /HPF
TIBC SERPL-MCNC: 272 UG/DL
UIBC SERPL-MCNC: 236 UG/DL
UROBILINOGEN URINE: NORMAL
WBC # BLD: 9.41 K/UL — SIGNIFICANT CHANGE UP (ref 3.8–10.5)
WBC # FLD AUTO: 8.61 K/UL
WBC # FLD AUTO: 9.41 K/UL — SIGNIFICANT CHANGE UP (ref 3.8–10.5)
WHITE BLOOD CELLS URINE: 1 /HPF

## 2020-09-01 PROCEDURE — 93990 DOPPLER FLOW TESTING: CPT

## 2020-09-01 PROCEDURE — 99285 EMERGENCY DEPT VISIT HI MDM: CPT

## 2020-09-01 PROCEDURE — 99024 POSTOP FOLLOW-UP VISIT: CPT

## 2020-09-01 PROCEDURE — 99223 1ST HOSP IP/OBS HIGH 75: CPT | Mod: GC

## 2020-09-01 PROCEDURE — 71046 X-RAY EXAM CHEST 2 VIEWS: CPT | Mod: 26

## 2020-09-01 PROCEDURE — 90935 HEMODIALYSIS ONE EVALUATION: CPT

## 2020-09-01 PROCEDURE — 93308 TTE F-UP OR LMTD: CPT | Mod: 26

## 2020-09-01 PROCEDURE — 93010 ELECTROCARDIOGRAM REPORT: CPT

## 2020-09-01 PROCEDURE — 99222 1ST HOSP IP/OBS MODERATE 55: CPT | Mod: GC,25

## 2020-09-01 RX ORDER — SODIUM BICARBONATE 1 MEQ/ML
650 SYRINGE (ML) INTRAVENOUS
Refills: 0 | Status: DISCONTINUED | OUTPATIENT
Start: 2020-09-01 | End: 2020-09-02

## 2020-09-01 RX ORDER — ACETAMINOPHEN 500 MG
650 TABLET ORAL EVERY 6 HOURS
Refills: 0 | Status: DISCONTINUED | OUTPATIENT
Start: 2020-09-01 | End: 2020-09-04

## 2020-09-01 RX ORDER — HEPARIN SODIUM 5000 [USP'U]/ML
5000 INJECTION INTRAVENOUS; SUBCUTANEOUS EVERY 8 HOURS
Refills: 0 | Status: DISCONTINUED | OUTPATIENT
Start: 2020-09-01 | End: 2020-09-04

## 2020-09-01 RX ORDER — FLUOXETINE HCL 10 MG
10 CAPSULE ORAL DAILY
Refills: 0 | Status: DISCONTINUED | OUTPATIENT
Start: 2020-09-01 | End: 2020-09-04

## 2020-09-01 RX ORDER — POLYETHYLENE GLYCOL 3350 17 G/17G
17 POWDER, FOR SOLUTION ORAL DAILY
Refills: 0 | Status: DISCONTINUED | OUTPATIENT
Start: 2020-09-01 | End: 2020-09-04

## 2020-09-01 RX ORDER — INFLUENZA VIRUS VACCINE 15; 15; 15; 15 UG/.5ML; UG/.5ML; UG/.5ML; UG/.5ML
0.5 SUSPENSION INTRAMUSCULAR ONCE
Refills: 0 | Status: COMPLETED | OUTPATIENT
Start: 2020-09-01 | End: 2020-09-04

## 2020-09-01 RX ORDER — OXYCODONE HYDROCHLORIDE 5 MG/1
2.5 TABLET ORAL ONCE
Refills: 0 | Status: DISCONTINUED | OUTPATIENT
Start: 2020-09-01 | End: 2020-09-01

## 2020-09-01 RX ORDER — LEVOTHYROXINE SODIUM 125 MCG
25 TABLET ORAL DAILY
Refills: 0 | Status: DISCONTINUED | OUTPATIENT
Start: 2020-09-01 | End: 2020-09-04

## 2020-09-01 RX ADMIN — OXYCODONE HYDROCHLORIDE 2.5 MILLIGRAM(S): 5 TABLET ORAL at 16:20

## 2020-09-01 RX ADMIN — OXYCODONE HYDROCHLORIDE 2.5 MILLIGRAM(S): 5 TABLET ORAL at 15:51

## 2020-09-01 RX ADMIN — Medication 650 MILLIGRAM(S): at 19:47

## 2020-09-01 RX ADMIN — HEPARIN SODIUM 5000 UNIT(S): 5000 INJECTION INTRAVENOUS; SUBCUTANEOUS at 21:25

## 2020-09-01 RX ADMIN — Medication 650 MILLIGRAM(S): at 20:24

## 2020-09-01 RX ADMIN — Medication 650 MILLIGRAM(S): at 19:48

## 2020-09-01 RX ADMIN — Medication 0.5 MILLIGRAM(S): at 12:24

## 2020-09-01 NOTE — DISCUSSION/SUMMARY
[FreeTextEntry1] : 36 yo female with a history of esrd starting hd today as inpatient via [facility] presents for follow up s/p left upper extremity radial cephalic avf. \par \par duplex shows patent avf with a 50-75% stenosis of the juxta anastomosis and flow rate of 725, of note the radial artery was retrograde \par \par I believe since the pain is limited to the 1st digit and thenar process and has been improving with time it is likely post surgical pain \par pt to follow up in 2 weeks for steal study

## 2020-09-01 NOTE — CONSULT NOTE ADULT - ATTENDING COMMENTS
patient seen at initiation of HD 9/1.  Well tolerated  1.  ESRD--progressive uremic symptoms including fatigue.  Has permcath and AVF.  Orders reviewed for HD low clearance  2.  Acidosis--no HCO3 rx, should improve with HD  3.  Secondary hyperparathyroidism.  Repeat PO4 level and define binder requirement  3.  Anemia--goal about 10hgb.  Review outpt data and likely optimize rx      HD--clinical status and VS stable for HD initiation and reviewed expectations with pt.  Tolerated initiation without hemodynamic or other compromise

## 2020-09-01 NOTE — ED PROVIDER NOTE - OBJECTIVE STATEMENT
35y Female PMHx end stage renal dz, depression, hypothyroidism presenting for dialysis. States she has had weakness for months, two weeks ago had AV fistula placement in L arm and portacath placement in R chest. Does endorse pain over L chest (at site of portacath) with deep breaths and activities. States has been making normal urine, no dysuria or hematuria. Eating and drinking well. No other complaints.    Last Cr: 6.6  PCP: Dr. Shelly Shelton ([facility])   Nephro: Paul Serrato ([facility]) 35y Female PMHx end stage renal dz, depression, hypothyroidism presenting for dialysis. States she has had weakness for months, worsening. States she sleeps most of the day. Two weeks ago had AV fistula placement in L arm and portacath placement in R chest. Does endorse pain over L chest (at site of portacath) with deep breaths and activities. States has been making normal urine, no dysuria or hematuria. Eating and drinking well. No other complaints. Denies diarrhea, N/V, pain anywhere. Does endorse anxiety re dialysis.     Last Cr: 6.6  PCP: Dr. Shelly Shelton ([facility])   Nephro: Paul Serrato ([facility])

## 2020-09-01 NOTE — ED PROVIDER NOTE - NS ED ROS FT
Gen: No fever, chills, night sweats. Normal appetite. Increased weakness and sleeping  Eyes: No changes in vision   ENT: No ear pain, congestion, sore throat  Resp: No cough. SOB with exertion   Cardiovascular: No palpitations. Pain over CVC site   Gastroenteric: No nausea, vomiting, diarrhea or constipation  :  No change in urine output, dysuria or hematuria   MS: No joint or muscle pain  Skin: No rashes, lacerations, wounds or abrasions   Neuro: No headache; no abnormal movements

## 2020-09-01 NOTE — ED PROVIDER NOTE - PROGRESS NOTE DETAILS
Attending Nimo Rivera: pt states very anxious about needing dialysis. will given small dose of benzo

## 2020-09-01 NOTE — ED PROVIDER NOTE - CLINICAL SUMMARY MEDICAL DECISION MAKING FREE TEXT BOX
Attending Nimo Rivera: 34 y/o female h/o CKD< polycystic kidney disease s/p recent cath placement by dr calhoun for dialysis sent in to start dialysis. upon arrival pt hemodynamically stable and afebrile. does endorse mild sob, pocus shows no evidence of pericardial effusion and preserved ejection fraction. will obtain repeat labs, d/w nephrology and likely admit. 34 yo F with CKD presenting for dialysis, suspect likely secondary to uremia given increased lethargy and sleeping. Will obtain cbc, cmp, vbg and speak to nephrology/admit for dialysis. Given c/o pain over CVC site and SOB on exertion will obtain CXR to eval catheter tip placement/assess for PTX.     Attending Nimo Rivera: 36 y/o female h/o CKD< polycystic kidney disease s/p recent cath placement by dr calhoun for dialysis sent in to start dialysis. upon arrival pt hemodynamically stable and afebrile. does endorse mild sob, pocus shows no evidence of pericardial effusion and preserved ejection fraction. will obtain repeat labs, d/w nephrology and likely admit.

## 2020-09-01 NOTE — H&P ADULT - NSHPREVIEWOFSYSTEMS_GEN_ALL_CORE
REVIEW OF SYSTEMS:    CONSTITUTIONAL: + weakness, no fevers or chills  EYES/ENT: No visual changes;  No vertigo or throat pain   NECK: No pain or stiffness  RESPIRATORY: No cough, wheezing, hemoptysis; + shortness of breath with exertion  CARDIOVASCULAR: No chest pain or palpitations  GASTROINTESTINAL: No abdominal or epigastric pain. No nausea, vomiting, or hematemesis; No diarrhea or constipation. No melena or hematochezia.  GENITOURINARY: No dysuria, frequency or hematuria  NEUROLOGICAL: No numbness or weakness  All other review of systems is negative unless indicated above.

## 2020-09-01 NOTE — REASON FOR VISIT
[de-identified] : left upper extremity radial cephalic avf [de-identified] : 8/19/20 [de-identified] : 34 yo female with a history of esrd starting hd today as inpatient via [facility] presents for follow up s/p left upper extremity radial cephalic avf.  pt reports pain with numbness and sharp sensation over the left thumb and thenar process.

## 2020-09-01 NOTE — PHYSICAL EXAM
[2+] : left 2+ [de-identified] : appears well  [FreeTextEntry1] : ulnar pulse 2+ [de-identified] : hand is pink \par  strength 5/5 b/l upper extremities  [de-identified] : incision clean and dry, brisk cap refill

## 2020-09-01 NOTE — CHART NOTE - NSCHARTNOTEFT_GEN_A_CORE
Search Terms: trina blount, 1985Search Date: 09/01/2020 14:54:29 PM  Searching on behalf of: [facility]  The Drug Utilization Report below displays all of the controlled substance prescriptions, if any, that your patient has filled in the last twelve months. The information displayed on this report is compiled from pharmacy submissions to the Department, and accurately reflects the information as submitted by the pharmacies.    This report was requested by: Javi Shane | Reference #: [id number]    Others' Prescriptions  Patient Name: Trina BlountBirth Date: 1985  Address: [address] [place] [id number]: Female  Rx Written	Rx Dispensed	Drug	Quantity	Days Supply	Prescriber Name	Payment Method	Dispenser  07/28/2020	07/28/2020	zolpidem tartrate 10 mg tablet	30	30	Jimy Li MD	Insurance	[facility]  06/26/2020	06/26/2020	zolpidem tartrate 10 mg tablet	30	30	Jimy Li MD	Insurance	[facility]  * - Drugs marked with an asterisk are compound drugs. If the compound drug is made up of more than one controlled substance, then each controlled substance will be a separate row in the table.

## 2020-09-01 NOTE — ED PROVIDER NOTE - PHYSICAL EXAMINATION
Attending Nimo Rivera: Gen: NAD, heent: atrauamtic, eomi, perrla, mmm, op pink, uvula midline, neck; nttp, no nuchal rigidity, chest: cath site to right chest, c/d/i, no crepitus, cv: rrr, no murmurs, lungs: ctab, abd: soft, nontender, nondistended, no peritoneal signs, +BS, no guarding, ext: wwp, neg homans, skin: no rash, neuro: awake and alert, following commands, speech clear, sensation and strength intact, no focal deficits Attending Nimo Rivera: Gen: NAD, heent: atrauamtic, eomi, perrla, mmm, op pink, uvula midline, neck; nttp, no nuchal rigidity, chest: cath site to right chest, c/d/i, no crepitus, cv: rrr, no murmurs, lungs: ctab, abd: soft, nontender, nondistended, no peritoneal signs, +BS, no guarding, ext: wwp, neg homans, skin: no rash, neuro: awake and alert, following commands, speech clear, sensation and strength intact, no focal deficits    CONSTITUTIONAL: NAD. Awake and conversive, cooperative with exam  EYES: EOMI, conjunctiva and sclera clear  ENMT: MMM, no pharyngeal injection or exudates   NECK: Supple, no palpable masses, no JVD  RESPIRATORY: Normal respiratory effort, CTAB, no wheezes, rales, or rhonchi  CARDIOVASCULAR: RRR, normal S1 and S2, no MRG, distal pulses 2+ bilaterally, capillary refill < 2 seconds, no peripheral edema. Right upper chest with central venous catheter, no surrounding erythema or drainage noted. Dressing has not been changed in x2 wks, crusted blood noted over dressing  ABDOMEN: Soft, non-distended, no TTP, +BS, no rebound/guarding  MUSCULOSKELETAL: No clubbing or cyanosis of digits, no joint swelling or tenderness  NEURO: AO to person, place, and time; following commands, moving all extremities spontaneously  PSYCH: appropriate affect

## 2020-09-01 NOTE — CONSULT NOTE ADULT - SUBJECTIVE AND OBJECTIVE BOX
[facility] DIVISION OF KIDNEY DISEASES AND HYPERTENSION   -- INITIAL CONSULT NOTE --  Alla Garzon  Nephrology Fellow  NS Pager: [phone] / ELOISA Pager: [phone]  After 5pm or on weekend, please page the on-call fellow)  --------------------------------------------------------------------------------    HPI:  35F PMHx CKD possibly 2/2 PCKD, hypothyroidism, depression who admitted for hemodialysis for uremia.  Pt was hospitalized in 2015 wherein found to have incidental kidney disease, followed with Dr. Brown De La Rosa ([facility]) in the past, now follows with Dr. Serrato ([facility]).   Outpatient workup showed atrophic L kidney (7.2 cm) with multiple bilateral cysts, proteinuria 3.7 grams and blood work negative for MATT, C3/4, cryoglobulin, hepatitis B, RF, PLA2R ab.  Recent outpatient serum Cr was 6.27 on 6/26/2020.  Pt had outpatient hemodailysis tunnel catheter and LUE AV fistula placed on 8/19/20 with Dr. Gomes.  Pt report her father required hemodialysis possible due to diabetes related kidney disease and has history kidney stone.    Today patient report past few weeks/months has felt increase fatigue to point she stays in bed/home all day.  Has dyspnea on exertion, generalized weakness with decrease urination (volume) and increase weight.  In ED, vitals within acceptable range (non hypertensive, afebrile, not hypoxic) with labs noted for BUN/Cr 58/7.30, anemic (hgb 8.2), acidotic (HCO3 14) with clear cxr and bedside u/s showed no pericardial effusion.  Pt consented for hemodialysis for uremia.      PAST HISTORY  --------------------------------------------------------------------------------  PAST MEDICAL & SURGICAL HISTORY:  OCD (obsessive compulsive disorder)  Attention deficit hyperactivity disorder (ADHD)  Polycystic kidney disease  H/O 3 abortions  History of fracture of tibia: 20 years ago- Left leg    FAMILY HISTORY:  Family history of polycystic kidney    PAST SOCIAL HISTORY: smoker intermittent for 12 years, daily cigarettes about 2.   ALLERGIES & MEDICATIONS  --------------------------------------------------------------------------------  Allergies    No Known Allergies    Intolerances    Standing Inpatient Medications  FLUoxetine 10 milliGRAM(s) Oral daily  heparin   Injectable 5000 Unit(s) SubCutaneous every 8 hours  levothyroxine 25 MICROGram(s) Oral daily  sodium bicarbonate 650 milliGRAM(s) Oral two times a day    PRN Inpatient Medications  acetaminophen   Tablet .. 650 milliGRAM(s) Oral every 6 hours PRN  polyethylene glycol 3350 17 Gram(s) Oral daily PRN    REVIEW OF SYSTEMS  Gen: no fever, chills.  + Fatigue, weakness, decrease appetite  Respiratory: No dyspnea, cough  CV: No chest pain  GI: No abdominal pain, nausea, vomiting, diarrhea  MSK: no edema  Neuro: No dizziness, lightheadedness  Heme: No bleeding  All other systems were reviewed and are negative, except as noted.    VITALS/PHYSICAL EXAM  --------------------------------------------------------------------------------  T(C): 36.6 (09-01-20 @ 16:00), Max: 36.8 (09-01-20 @ 11:58)  HR: 79 (09-01-20 @ 16:00) (79 - 81)  BP: 145/96 (09-01-20 @ 16:00) (133/87 - 145/96)  RR: 17 (09-01-20 @ 16:00) (17 - 20)  SpO2: 100% (09-01-20 @ 16:00) (99% - 100%)  Wt(kg): --  Height (cm): 149.86 (09-01-20 @ 11:12)  Weight (kg): 65.8 (09-01-20 @ 11:12)  BMI (kg/m2): 29.3 (09-01-20 @ 11:12)  BSA (m2): 1.61 (09-01-20 @ 11:12)    Physical Exam:              Gen: NAD, well-appearing on room air  	HEENT: moist mucous membrane  	Pulm: CTA B/L, no crackles  	CV: RRR, S1S2; no rub/murmur  	GI: +BS, soft, nontender/nondistended  	: No suprapubic tenderness  	MSK: Warm, no edema, LUE AVF               Neuro: AAOx3  	Psych: Normal affect and mood  	Skin: Warm  	Vascular access:  R tunnel hd catheter    LABS/STUDIES  --------------------------------------------------------------------------------                8.2    9.41  >-----------<  291      [09-01-20 @ 12:03]              26.8     143  |  114  |  58  ----------------------------<  101      [09-01-20 @ 12:03]  4.3   |  14  |  7.30        Ca     8.5     [09-01-20 @ 12:03]    TPro  6.8  /  Alb  3.6  /  TBili  0.2  /  DBili  x   /  AST  21  /  ALT  8   /  AlkPhos  107  [09-01-20 @ 12:03]          Creatinine Trend:  SCr 7.30 [09-01 @ 12:03]

## 2020-09-01 NOTE — H&P ADULT - NSHPLABSRESULTS_GEN_ALL_CORE
8.2    9.41  )-----------( 291      ( 01 Sep 2020 12:03 )             26.8       09-01    143  |  114<H>  |  58<H>  ----------------------------<  101<H>  4.3   |  14<L>  |  7.30<H>    Ca    8.5      01 Sep 2020 12:03    TPro  6.8  /  Alb  3.6  /  TBili  0.2  /  DBili  x   /  AST  21  /  ALT  8<L>  /  AlkPhos  107  09-01                      Lactate Trend            CAPILLARY BLOOD GLUCOSE

## 2020-09-01 NOTE — CHART NOTE - NSCHARTNOTEFT_GEN_A_CORE
Dialysis Consent   Thoroughly reviewed risks and benefits of RRT/HD with patient in ED, who agrees to dialytic therapy if needed. All questions answered.  Witnessed by Dr. Kaur  Paper form consent obtained and given to HD unit.

## 2020-09-01 NOTE — H&P ADULT - ATTENDING COMMENTS
-Patient seen/examined on 9/1/20. Case/plan discussed with the sub-intern and resident as reviewed/edited by me above and in any comments below.  -New HD. Has permacath. Left wrist AVF with strong palpable thrill. -F/u renal recs regarding HD.   -Pain control PRN.   -Smoking cessation counseling.

## 2020-09-01 NOTE — ED PROVIDER NOTE - ATTENDING CONTRIBUTION TO CARE
Attending MD Nimo Rivera:  I personally have seen and examined this patient.  Resident note reviewed and agree on plan of care and except where noted.  See HPI, PE, and MDM for details.

## 2020-09-01 NOTE — H&P ADULT - PROBLEM SELECTOR PLAN 1
-Sent into hospital by nephrologist Dr. Paul Serrato for urgent HD since pt having insurance issues to get HD set up outpatient   -CKD 5 likely sec to ?polycystic kidney disease/one atrophic kidney from unknown cause  -AVF on L, not matured. r chest wall permacath placed by vascular surgery last month on 8/19  -admitting labs: Crt 58, Crt 7.3, pH on VBG 7.24  -Nephro consulted, to get HD session today

## 2020-09-01 NOTE — H&P ADULT - PROBLEM SELECTOR PLAN 3
-Presented with Hb 8.2  -Likely in context of recent menstrual cycle, which ended 8/31. Per pt, she has heavy bleeding  -Monitor with CBC qd  -Iron studies in am

## 2020-09-01 NOTE — H&P ADULT - ASSESSMENT
35y female with a prior medical history of polycystic kidney disease with one atrophic kidney (pt was unsure which kidney is atrophic), hypothyroidism, and depression p/w ARF with elevated BUN, Crt, metabolic acidodis with urgent need for HD.

## 2020-09-01 NOTE — H&P ADULT - NSHPPHYSICALEXAM_GEN_ALL_CORE
GENERAL: NAD, lying in bed comfortably  HEAD:  Atraumatic, Normocephalic  EYES: EOMI, PERRLA, conjunctiva and sclera clear  ENT: Moist mucous membranes  NECK: Supple, No JVD  CHEST/LUNG: Clear to auscultation bilaterally; No rales, rhonchi, wheezing, or rubs. Unlabored respirations  HEART: Regular rate and rhythm; No murmurs, rubs, or gallops  ABDOMEN: Bowel sounds present; Soft, Nontender, Nondistended. No hepatomegaly  EXTREMITIES:  2+ Peripheral Pulses, L AVF with thrill  NERVOUS SYSTEM:  Alert & Oriented X3, speech clear. No deficits   MSK: FROM all 4 extremities, full and equal strength  SKIN: Permacath R chest wall

## 2020-09-01 NOTE — CONSULT NOTE ADULT - ASSESSMENT
35F PMHx CKD possibly 2/2 PCKD, hypothyroidism, depression who admitted for hemodialysis for uremia, started on hemodialysis on 9/1/2020.          CKD5 likely 2/2 PCKD, now uremic requiring initiation of hemodialysis  On admission BUN?Cr 58/7.30 with uremia (fatigue, weakness), dyspnea on exertion, decrease UOP.  Initiated on hemodialysis on 9/1/20 via R tunnel hd catheter (placed outpatient).  - plan for hemodialysis (first treatment) today 9/1 for 2hrs, , no UF.  Next treatment (#2) tomorrow  -  consult for hemodialysis placement (patient desire going [facility] at [address])  - monitor BMP, daily weight, strict I/O, avoid nephrotoxic agents (NSAIDs, PPI, contrast) to preserve residual renal function, renally dose medications per HD, renal diet (low potassium/phos)      Metabolic acidosis in setting of advanced CKD. Serum bicarbonate noted to be 14 on admission (9/1).   - plan for hemodialysis treatment.  Monitor serum bicarbonate    Renal bone disease 2/2 advanced CKD  - check serum phosphorus.  Low phosphorus diet advised. Monitor serum phosphorus    Hypertension  - resume home medications, monitor BP 35F PMHx CKD possibly 2/2 PCKD, hypothyroidism, depression who admitted for hemodialysis for uremia, started on hemodialysis on 9/1/2020.          CKD5 likely 2/2 PCKD, now uremic requiring initiation of hemodialysis  On admission BUN/Cr 58/7.30 with uremia (fatigue, weakness), dyspnea on exertion, decrease UOP.  Initiated on hemodialysis on 9/1/20 via R tunnel hd catheter (placed outpatient).  - plan for hemodialysis (first treatment) today 9/1 for 2hrs, , no UF.  Next treatment (#2) tomorrow  -  consult for hemodialysis placement (patient desire going [facility] at [address])  - monitor BMP, daily weight, strict I/O, avoid nephrotoxic agents (NSAIDs, PPI, contrast) to preserve residual renal function, renally dose medications per HD, renal diet (low potassium/phos)      Metabolic acidosis in setting of advanced CKD. Serum bicarbonate noted to be 14 on admission (9/1).   - plan for hemodialysis treatment.  Monitor serum bicarbonate    Renal bone disease 2/2 advanced CKD  - check serum phosphorus.  Low phosphorus diet advised. Monitor serum phosphorus    Hypertension  - resume home medications, monitor BP 35F PMHx CKD possibly 2/2 PCKD, hypothyroidism, depression who admitted for hemodialysis for uremia, started on hemodialysis on 9/1/2020.          CKD5 likely 2/2 PCKD, now uremic requiring initiation of hemodialysis  On admission BUN/Cr 58/7.30 with uremia (fatigue, weakness), dyspnea on exertion, decrease UOP.  Initiated on hemodialysis on 9/1/20 via R tunnel hd catheter (placed outpatient).  - plan for hemodialysis (first treatment) today 9/1 for 2hrs, , no UF.  Next treatment (#2) tomorrow  -  consult for hemodialysis placement (patient desire going [facility] at [address] Lulú Serrato)  - will need start diuretic to preserve residual renal function (urine output)  - monitor BMP, daily weight, strict I/O, avoid nephrotoxic agents (NSAIDs, PPI, contrast) to preserve residual renal function, renally dose medications per HD, renal diet (low potassium/phos)      Metabolic acidosis in setting of advanced CKD. Serum bicarbonate noted to be 14 on admission (9/1).   - plan for hemodialysis treatment.  Monitor serum bicarbonate    Renal bone disease 2/2 advanced CKD  - check serum phosphorus.  Low phosphorus diet advised. Monitor serum phosphorus    Hypertension  - resume home medications, monitor BP 35F PMHx CKD possibly 2/2 PCKD, hypothyroidism, depression who admitted for hemodialysis for uremia, started on hemodialysis on 9/1/2020.          CKD5 likely 2/2 PCKD, now uremic requiring initiation of hemodialysis  On admission BUN/Cr 58/7.30 with uremia (fatigue, weakness), dyspnea on exertion, decrease UOP.  Initiated on hemodialysis on 9/1/20 via R tunnel hd catheter (placed outpatient).  - plan for hemodialysis (first treatment) today 9/1 for 2hrs, , no UF.  Next treatment (#2) tomorrow  -  consult for hemodialysis placement (patient desire going [facility] at [address] Lulú Serrato)  - will need start diuretic to preserve residual renal function (urine output)  - monitor BMP, daily weight, strict I/O, avoid nephrotoxic agents (NSAIDs, PPI, contrast) to preserve residual renal function, renally dose medications per HD, renal diet (low potassium/phos)      Metabolic acidosis in setting of advanced CKD. Serum bicarbonate noted to be 14 on admission (9/1).   - plan for hemodialysis treatment.  Monitor serum bicarbonate    Renal bone disease 2/2 advanced CKD  - check serum phosphorus.  Low phosphorus diet advised. Monitor serum phosphorus

## 2020-09-01 NOTE — H&P ADULT - HISTORY OF PRESENT ILLNESS
35y female with a prior medical history of polycystic kidney disease and one atrophic kidney (pt was unsure which kidney is atrophic) presenting today on for initiation of dialysis as per her nephrologist.  Patient states that she has had progressive fatigue and difficulty concentrating for the past 5 months.  On 8/19/20, patient had placement of portacath in R chest and an AV fistula in her L wrist with Dr. Scott Gomes, her vascular surgeon.  She states that she has not experienced any chest pain but does endorse shortness of breath on exertion.  Patient endorses normal urination without dysuria or hematuria.  Patient also endorses large volume of nausea and vomiting, which stopped with her surgery on 8/19.      FamHx: father - diabetes requiring dialysis, kidney stones  Social Hx: smoker on and off for 12 years, ~2 cigarettes daily; has not smoked since her surgery on 8/19. 35y female with a prior medical history of polycystic kidney disease and one atrophic kidney (pt was unsure which kidney is atrophic) presenting today on for initiation of dialysis as per her nephrologist.  Patient states that she has had progressive fatigue and difficulty concentrating for the past 5 months.  On 8/19/20, patient had placement of portacath in R chest and an AV fistula in her L wrist with Dr. Scott Gomes, her vascular surgeon.  She states that she has not experienced any chest pain but does endorse shortness of breath on exertion.  Patient endorses normal urination without dysuria or hematuria.  Patient also endorses a recent large volume of nausea and vomiting, which stopped with her surgery on 8/19.      FamHx: father - diabetes requiring dialysis, kidney stones  Social Hx: smoker on and off for 12 years, ~2 cigarettes daily; has not smoked since her surgery on 8/19. 35y female with a prior medical history of polycystic kidney disease, depression, hypothyroidism and one atrophic kidney (pt was unsure which kidney is atrophic) presenting today on for initiation of dialysis as per her nephrologist.  Patient states that she has had progressive fatigue and difficulty concentrating for the past 5 months.  On 8/19/20, patient had placement of portacath in R chest and an AV fistula in her L wrist with Dr. Scott Gomes, her vascular surgeon.  She states that she has not experienced any chest pain but does endorse shortness of breath on exertion.  Patient endorses normal urination without dysuria or hematuria.  Patient also endorses a recent large volume of nausea and vomiting, which stopped with her surgery on 8/19.      Social Hx: smoker on and off for 12 years, ~2 cigarettes daily; has not smoked since her surgery on 8/19.  FamHx: father - diabetes requiring dialysis, kidney stones 35y female with a prior medical history of polycystic kidney disease with one atrophic kidney (pt was unsure which kidney is atrophic), hypothyroidism, and depression presenting today on for initiation of dialysis as per her nephrologist.  Patient states that she has had progressive fatigue and difficulty concentrating for the past 5 months.  On 8/19/20, patient had placement of portacath in R chest and an AV fistula in her L wrist with Dr. Scott Gomes, her vascular surgeon.  She states that she has not experienced any chest pain but does endorse shortness of breath on exertion.  Patient endorses normal urination without dysuria or hematuria.  Patient also endorses a recent large volume of nausea and vomiting, which stopped with her surgery on 8/19.      Social Hx: smoker on and off for 12 years, ~2 cigarettes daily; has not smoked since her surgery on 8/19.  FamHx: father - diabetes requiring dialysis, kidney stones 35y female with a prior medical history of polycystic kidney disease with one atrophic kidney (pt was unsure which kidney is atrophic), hypothyroidism, and depression presenting today on for initiation of dialysis as per her nephrologist.  Patient states that she has had progressive fatigue and difficulty concentrating for the past 5 months.  On 8/19/20, patient had placement of Permacath in R chest and an AV fistula in her L wrist with Dr. Scott Gomes, her vascular surgeon.  She states that she has not experienced any chest pain but does endorse shortness of breath on exertion.  Patient endorses normal urination without dysuria or hematuria.  Patient also endorses a recent large volume of nausea and vomiting, which stopped with her surgery on 8/19.      Social Hx: smoker on and off for 12 years, ~2 cigarettes daily; has not smoked since her surgery on 8/19.  FamHx: father - diabetes requiring dialysis, kidney stones

## 2020-09-01 NOTE — ED ADULT NURSE NOTE - OBJECTIVE STATEMENT
35y F aaox4 Hx End stage renal Dz, depression, hypothyroidism , ambulatory from home presents to Ed for dialysis , pt wwas placed 2 weeks ago AV fistula in the R wrist ( area not s/sx of infection, no swelling, redness)  not allowed to used yet, also 2 weeks ago was placed a Yanelis cath at the R chest (( area not s/sx of infection, no swelling, redness)  as per pt states she was feeling weak for months, and MD sent to Ed for start dialysis. Pt endorses that she still producing urine,  pt have b/l lower extremities edema +1, denies pain, burning in urination. Pt denies CP, SOB, HA, vision changes, n/v/d, fevers chills, abdominal pain..

## 2020-09-02 ENCOUNTER — TRANSCRIPTION ENCOUNTER (OUTPATIENT)
Age: 35
End: 2020-09-02

## 2020-09-02 LAB
24R-OH-CALCIDIOL SERPL-MCNC: 16.4 NG/ML — LOW (ref 30–80)
ANION GAP SERPL CALC-SCNC: 15 MMOL/L — SIGNIFICANT CHANGE UP (ref 5–17)
BUN SERPL-MCNC: 32 MG/DL — HIGH (ref 7–23)
CALCIUM SERPL-MCNC: 8.4 MG/DL — SIGNIFICANT CHANGE UP (ref 8.4–10.5)
CALCIUM SERPL-MCNC: 8.8 MG/DL — SIGNIFICANT CHANGE UP (ref 8.4–10.5)
CHLORIDE SERPL-SCNC: 107 MMOL/L — SIGNIFICANT CHANGE UP (ref 96–108)
CO2 SERPL-SCNC: 20 MMOL/L — LOW (ref 22–31)
CREAT SERPL-MCNC: 4.96 MG/DL — HIGH (ref 0.5–1.3)
FERRITIN SERPL-MCNC: 60 NG/ML — SIGNIFICANT CHANGE UP (ref 15–150)
GAS PNL BLDV: SIGNIFICANT CHANGE UP
GLUCOSE SERPL-MCNC: 73 MG/DL — SIGNIFICANT CHANGE UP (ref 70–99)
HCT VFR BLD CALC: 27.6 % — LOW (ref 34.5–45)
HGB BLD-MCNC: 8.7 G/DL — LOW (ref 11.5–15.5)
IRON SATN MFR SERPL: 31 % — SIGNIFICANT CHANGE UP (ref 14–50)
IRON SATN MFR SERPL: 73 UG/DL — SIGNIFICANT CHANGE UP (ref 30–160)
MAGNESIUM SERPL-MCNC: 1.9 MG/DL — SIGNIFICANT CHANGE UP (ref 1.6–2.6)
MCHC RBC-ENTMCNC: 27.9 PG — SIGNIFICANT CHANGE UP (ref 27–34)
MCHC RBC-ENTMCNC: 31.5 GM/DL — LOW (ref 32–36)
MCV RBC AUTO: 88.5 FL — SIGNIFICANT CHANGE UP (ref 80–100)
NRBC # BLD: 0 /100 WBCS — SIGNIFICANT CHANGE UP (ref 0–0)
PHOSPHATE SERPL-MCNC: 5.3 MG/DL — HIGH (ref 2.5–4.5)
PLATELET # BLD AUTO: 285 K/UL — SIGNIFICANT CHANGE UP (ref 150–400)
POTASSIUM SERPL-MCNC: 3.7 MMOL/L — SIGNIFICANT CHANGE UP (ref 3.5–5.3)
POTASSIUM SERPL-SCNC: 3.7 MMOL/L — SIGNIFICANT CHANGE UP (ref 3.5–5.3)
PTH-INTACT FLD-MCNC: 1011 PG/ML — HIGH (ref 15–65)
RBC # BLD: 3.12 M/UL — LOW (ref 3.8–5.2)
RBC # FLD: 14.7 % — HIGH (ref 10.3–14.5)
SODIUM SERPL-SCNC: 142 MMOL/L — SIGNIFICANT CHANGE UP (ref 135–145)
TIBC SERPL-MCNC: 237 UG/DL — SIGNIFICANT CHANGE UP (ref 220–430)
TSH SERPL-MCNC: 11.1 UIU/ML — HIGH (ref 0.27–4.2)
UIBC SERPL-MCNC: 164 UG/DL — SIGNIFICANT CHANGE UP (ref 110–370)
WBC # BLD: 7.05 K/UL — SIGNIFICANT CHANGE UP (ref 3.8–10.5)
WBC # FLD AUTO: 7.05 K/UL — SIGNIFICANT CHANGE UP (ref 3.8–10.5)

## 2020-09-02 PROCEDURE — 99233 SBSQ HOSP IP/OBS HIGH 50: CPT | Mod: GC

## 2020-09-02 PROCEDURE — 99232 SBSQ HOSP IP/OBS MODERATE 35: CPT | Mod: GC

## 2020-09-02 RX ORDER — FUROSEMIDE 40 MG
80 TABLET ORAL DAILY
Refills: 0 | Status: DISCONTINUED | OUTPATIENT
Start: 2020-09-02 | End: 2020-09-04

## 2020-09-02 RX ORDER — CHOLECALCIFEROL (VITAMIN D3) 125 MCG
2000 CAPSULE ORAL DAILY
Refills: 0 | Status: DISCONTINUED | OUTPATIENT
Start: 2020-09-02 | End: 2020-09-04

## 2020-09-02 RX ORDER — ZOLPIDEM TARTRATE 10 MG/1
5 TABLET ORAL AT BEDTIME
Refills: 0 | Status: DISCONTINUED | OUTPATIENT
Start: 2020-09-02 | End: 2020-09-02

## 2020-09-02 RX ADMIN — Medication 25 MICROGRAM(S): at 05:17

## 2020-09-02 RX ADMIN — Medication 80 MILLIGRAM(S): at 14:08

## 2020-09-02 RX ADMIN — HEPARIN SODIUM 5000 UNIT(S): 5000 INJECTION INTRAVENOUS; SUBCUTANEOUS at 21:30

## 2020-09-02 RX ADMIN — ZOLPIDEM TARTRATE 5 MILLIGRAM(S): 10 TABLET ORAL at 22:32

## 2020-09-02 RX ADMIN — Medication 10 MILLIGRAM(S): at 14:08

## 2020-09-02 RX ADMIN — Medication 2000 UNIT(S): at 14:08

## 2020-09-02 RX ADMIN — HEPARIN SODIUM 5000 UNIT(S): 5000 INJECTION INTRAVENOUS; SUBCUTANEOUS at 14:08

## 2020-09-02 RX ADMIN — HEPARIN SODIUM 5000 UNIT(S): 5000 INJECTION INTRAVENOUS; SUBCUTANEOUS at 05:17

## 2020-09-02 NOTE — PROGRESS NOTE ADULT - ASSESSMENT
35y female with a prior medical history of polycystic kidney disease with one atrophic kidney (pt was unsure which kidney is atrophic), hypothyroidism, and depression p/w ARF with elevated BUN, Crt, metabolic acidodis with urgent need for HD. 35y female with a prior medical history of polycystic kidney disease with one atrophic kidney (pt was unsure which kidney is atrophic), hypothyroidism, and depression p/w ARF with elevated BUN, Crt, metabolic acidodis with urgent need for HD. Patient received first session of HD yest, now being followed by Nephro 35y female with a prior medical history of polycystic kidney disease with one atrophic kidney (pt was unsure which kidney is atrophic), hypothyroidism, and depression p/w ARF with elevated BUN, Crt, metabolic acidosis with urgent need for HD. Patient received first session of HD yest, now being followed by Nephro

## 2020-09-02 NOTE — DISCHARGE NOTE PROVIDER - NSDCMRMEDTOKEN_GEN_ALL_CORE_FT
Ambien 10 mg oral tablet: 1 tab(s) orally once a day (at bedtime), As Needed  levothyroxine 25 mcg (0.025 mg) oral tablet: 1 tab(s) orally once a day  PROzac 10 mg oral capsule: 1 cap(s) orally once a day  sodium bicarbonate 650 mg oral tablet: 2 tab(s) orally 2 times a day (with meals)  Tylenol: as needed Ambien 5 mg oral tablet: 1 tab(s) orally once a day (at bedtime), As needed, Insomnia  cholecalciferol oral tablet: 2000 unit(s) orally once a day  furosemide 80 mg oral tablet: 1 tab(s) orally once a day  PROzac 10 mg oral capsule: 1 cap(s) orally once a day  Synthroid 25 mcg (0.025 mg) oral tablet: 1 tab(s) orally once a day  Tylenol 325 mg oral tablet: 2 tab(s) orally every 6 hours, As needed, Mild Pain (1 - 3), Moderate Pain (4 - 6)

## 2020-09-02 NOTE — PROGRESS NOTE ADULT - ASSESSMENT
35F PMHx CKD possibly 2/2 PCKD, hypothyroidism, depression who admitted for hemodialysis for uremia, started on hemodialysis on 9/1/2020.          CKD5 likely 2/2 PCKD, now uremic requiring initiation of hemodialysis  On admission BUN/Cr 58/7.30 with uremia (fatigue, weakness), dyspnea on exertion, decrease UOP.  Initiated on hemodialysis on 9/1/20 via R tunnel hd catheter (placed outpatient).  - plan for hemodialysis 2nd treatment today 9/2 for 2.5hrs, , no UF.  Next treatment (#3) tomorrow  -  consult for hemodialysis placement (patient desire going [facility] at [address] Lulú Serrato)  - start lasix 80 PO daily to preserve remaining urine function  - monitor BMP, daily weight, strict I/O, avoid nephrotoxic agents (NSAIDs, PPI, contrast) to preserve residual renal function, renally dose medications per HD, renal diet (low potassium/phos)      Metabolic acidosis in setting of advanced CKD. Serum bicarbonate noted to be 14 on admission (9/1).   - discontinue sodium bicarb tabs on she on hemodialysis (HD has bicarb bath)  - plan for hemodialysis treatment.  Monitor serum bicarbonate    Renal bone disease 2/2 advanced CKD  - check serum phosphorus.  Low phosphorus diet advised. Monitor serum phosphorus 35F PMHx CKD possibly 2/2 PCKD, hypothyroidism, depression who admitted for hemodialysis for uremia, started on hemodialysis on 9/1/2020.          CKD5 likely 2/2 PCKD, now uremic requiring initiation of hemodialysis  On admission BUN/Cr 58/7.30 with uremia (fatigue, weakness), dyspnea on exertion, decrease UOP.  Initiated on hemodialysis on 9/1/20 via R tunnel hd catheter (placed outpatient).  - plan for hemodialysis 2nd treatment today 9/2 for 2.5hrs, , no UF.  Next treatment (#3) tomorrow  -  consult for hemodialysis placement (patient desire going [facility] at [address] Lulú Serrato)  - start lasix 80 PO daily to preserve remaining urine function  - monitor BMP, daily weight, strict I/O, avoid nephrotoxic agents (NSAIDs, PPI, contrast) to preserve residual renal function, renally dose medications per HD, renal diet (low potassium/phos)      Metabolic acidosis in setting of advanced CKD. Serum bicarbonate noted to be 14 on admission (9/1).   - discontinue sodium bicarb tabs on she on hemodialysis  - continue hemodialysis treatment (HD has bicarb bath).  Monitor serum bicarbonate    Renal bone disease 2/2 advanced CKD, serum phos 5.3  - Low phosphorus diet advised. Monitor serum phosphorus

## 2020-09-02 NOTE — PROGRESS NOTE ADULT - ATTENDING COMMENTS
-Patient seen/examined on 9/2/20. Case/plan discussed with the intern and resident as reviewed/edited by me above and in any comments below.  -Was a bit anxious and nauseous during HD today.   -Discussed with SW and CM regarding DCP.   -F/u renal recs.

## 2020-09-02 NOTE — PROGRESS NOTE ADULT - PROBLEM SELECTOR PLAN 4
-c/w Prozac -Presented with Hb 8.2  -Likely in context of recent menstrual cycle, which ended 8/31. Per pt, she has heavy bleeding  -Monitor with CBC qd  -Iron studies wnl

## 2020-09-02 NOTE — PROGRESS NOTE ADULT - PROBLEM SELECTOR PLAN 2
-Created 8/19 by vascular surgery, L radial cephalic  -pain at site, s/p 1 dose oxy 2.5 -Created 8/19 by vascular surgery, L radial cephalic  -No pain at site today PH 7.24 on admission  -Today's pH improved to 7.34  -Bicarb today 22, inc from 14  -D/c Sodium Bicarb

## 2020-09-02 NOTE — PROGRESS NOTE ADULT - SUBJECTIVE AND OBJECTIVE BOX
[facility] DIVISION OF KIDNEY DISEASES AND HYPERTENSION   -- FOLLOW UP NOTE --   Alla Garzon  Nephrology Fellow  Pager NS: [phone]   /  Pager [facility]: [phone]  (after 5pm or weekend please page the on-call fellow)  --------------------------------------------------------------------------------  24 hour events/subjective:  - yesterday patient initiated on hemodialysis first treatment, tolerated well  - overnight no events reported, vitals afebrile no hypotensive episode  - patient seen and examined at bedside this morning without complaints  - vitals/lab/medications reviewed    PAST HISTORY  --------------------------------------------------------------------------------  No significant changes to PMH, PSH, FHx, SHx, unless otherwise noted    ALLERGIES & MEDICATIONS  --------------------------------------------------------------------------------  Allergies    No Known Allergies    Intolerances      Standing Inpatient Medications  FLUoxetine 10 milliGRAM(s) Oral daily  heparin   Injectable 5000 Unit(s) SubCutaneous every 8 hours  influenza   Vaccine 0.5 milliLiter(s) IntraMuscular once  levothyroxine 25 MICROGram(s) Oral daily  sodium bicarbonate 650 milliGRAM(s) Oral two times a day    PRN Inpatient Medications  acetaminophen   Tablet .. 650 milliGRAM(s) Oral every 6 hours PRN  polyethylene glycol 3350 17 Gram(s) Oral daily PRN    REVIEW OF SYSTEMS  Gen: no fever, chills.  + Fatigue, weakness, decrease appetite  Respiratory: No dyspnea, cough  CV: No chest pain  GI: No abdominal pain, nausea, vomiting, diarrhea  MSK: no edema  Neuro: No dizziness, lightheadedness  Heme: No bleeding  All other systems were reviewed and are negative, except as noted.    VITALS/PHYSICAL EXAM  --------------------------------------------------------------------------------  T(C): 37 (09-02-20 @ 04:31), Max: 37 (09-02-20 @ 04:31)  HR: 80 (09-02-20 @ 04:31) (72 - 81)  BP: 114/77 (09-02-20 @ 04:31) (114/77 - 149/93)  RR: 18 (09-02-20 @ 04:31) (17 - 20)  SpO2: 98% (09-02-20 @ 04:31) (98% - 100%)  Wt(kg): --  Height (cm): 122.2 (09-01-20 @ 19:24)  Weight (kg): 67.404 (09-01-20 @ 19:24)  BMI (kg/m2): 45.1 (09-01-20 @ 19:24)  BSA (m2): 1.4 (09-01-20 @ 19:24)    Physical Exam:              Gen: NAD, well-appearing on room air  	HEENT: moist mucous membrane  	Pulm: CTA B/L, no crackles  	CV: RRR, S1S2; no rub/murmur  	GI: +BS, soft, nontender/nondistended  	: No suprapubic tenderness  	MSK: Warm, no edema, LUE AVF               Neuro: AAOx3  	Psych: Normal affect and mood  	Skin: Warm  	Vascular access:  R tunnel hd catheter    LABS/STUDIES  --------------------------------------------------------------------------------              8.7    7.05  >-----------<  285      [09-02-20 @ 07:12]              27.6     143  |  114  |  58  ----------------------------<  101      [09-01-20 @ 12:03]  4.3   |  14  |  7.30        Ca     8.5     [09-01-20 @ 12:03]    TPro  6.8  /  Alb  3.6  /  TBili  0.2  /  DBili  x   /  AST  21  /  ALT  8   /  AlkPhos  107  [09-01-20 @ 12:03]          Creatinine Trend:  SCr 7.30 [09-01 @ 12:03] [facility] DIVISION OF KIDNEY DISEASES AND HYPERTENSION   -- FOLLOW UP NOTE --   Alla Garzon  Nephrology Fellow  Pager NS: [phone]   /  Pager [facility]: [phone]  (after 5pm or weekend please page the on-call fellow)  --------------------------------------------------------------------------------  24 hour events/subjective:  - yesterday patient initiated on hemodialysis first treatment, tolerated well  - overnight no events reported, vitals afebrile no hypotensive episode  - patient seen and examined at bedside this morning without complaints  - vitals/lab/medications reviewed downtrending/improving BUN 58 to 32    PAST HISTORY  --------------------------------------------------------------------------------  No significant changes to PMH, PSH, FHx, SHx, unless otherwise noted    ALLERGIES & MEDICATIONS  --------------------------------------------------------------------------------  Allergies    No Known Allergies    Intolerances      Standing Inpatient Medications  FLUoxetine 10 milliGRAM(s) Oral daily  heparin   Injectable 5000 Unit(s) SubCutaneous every 8 hours  influenza   Vaccine 0.5 milliLiter(s) IntraMuscular once  levothyroxine 25 MICROGram(s) Oral daily  sodium bicarbonate 650 milliGRAM(s) Oral two times a day    PRN Inpatient Medications  acetaminophen   Tablet .. 650 milliGRAM(s) Oral every 6 hours PRN  polyethylene glycol 3350 17 Gram(s) Oral daily PRN    REVIEW OF SYSTEMS  Gen: no fever, chills.  + Fatigue, weakness  Respiratory: No dyspnea, cough  CV: No chest pain  GI: No abdominal pain, nausea, vomiting, diarrhea  MSK: no edema  Neuro: No dizziness, lightheadedness  Heme: No bleeding  All other systems were reviewed and are negative, except as noted.    VITALS/PHYSICAL EXAM  --------------------------------------------------------------------------------  T(C): 37 (09-02-20 @ 04:31), Max: 37 (09-02-20 @ 04:31)  HR: 80 (09-02-20 @ 04:31) (72 - 81)  BP: 114/77 (09-02-20 @ 04:31) (114/77 - 149/93)  RR: 18 (09-02-20 @ 04:31) (17 - 20)  SpO2: 98% (09-02-20 @ 04:31) (98% - 100%)  Wt(kg): --  Height (cm): 122.2 (09-01-20 @ 19:24)  Weight (kg): 67.404 (09-01-20 @ 19:24)  BMI (kg/m2): 45.1 (09-01-20 @ 19:24)  BSA (m2): 1.4 (09-01-20 @ 19:24)    Physical Exam:              Gen: NAD, well-appearing on room air  	HEENT: moist mucous membrane  	Pulm: CTA B/L, no crackles  	CV: RRR, S1S2; no rub/murmur  	GI: +BS, soft, nontender/nondistended  	: No suprapubic tenderness  	MSK: Warm, no edema, LUE AVF               Neuro: AAOx3  	Psych: Normal affect and mood  	Skin: Warm  	Vascular access:  R tunnel hd catheter    LABS/STUDIES  --------------------------------------------------------------------------------              8.7    7.05  >-----------<  285      [09-02-20 @ 07:12]              27.6     143  |  114  |  58  ----------------------------<  101      [09-01-20 @ 12:03]  4.3   |  14  |  7.30        Ca     8.5     [09-01-20 @ 12:03]    TPro  6.8  /  Alb  3.6  /  TBili  0.2  /  DBili  x   /  AST  21  /  ALT  8   /  AlkPhos  107  [09-01-20 @ 12:03]          Creatinine Trend:  SCr 7.30 [09-01 @ 12:03]

## 2020-09-02 NOTE — PROGRESS NOTE ADULT - PROBLEM SELECTOR PLAN 1
-Sent into hospital by nephrologist Dr. Paul Serrato for urgent HD since pt having insurance issues to get HD set up outpatient   -CKD 5 likely sec to ?polycystic kidney disease/one atrophic kidney from unknown cause  -AVF on L, not matured. r chest wall permacath placed by vascular surgery last month on 8/19  -admitting labs: Crt 58, Crt 7.3, pH on VBG 7.24  -Nephro consulted, to get HD session today -Sent into hospital by nephrologist Dr. Paul Serrato for urgent HD since pt having insurance issues to get HD set up outpatient   -CKD 5 likely sec to ?polycystic kidney disease/one atrophic kidney from unknown cause  -AVF on L, not matured. r chest wall permacath placed by vascular surgery last month on 8/19  -admitting labs: Crt 58, Crt 7.3, pH on VBG 7.24  -Nephro recs appreciated   -first session of HD given yest successfully   -2nd session of HD today -Sent into hospital by nephrologist Dr. Paul Serrato for urgent HD since pt having insurance issues to get HD set up outpatient   -CKD 5 likely sec to ?polycystic kidney disease/one atrophic kidney from unknown cause  -AVF on L, not matured. r chest wall permacath placed by vascular surgery last month on 8/19  -admitting labs: Crt 58, Crt 7.3, pH on VBG 7.24  -Nephro recs appreciated   -Started Lasix 80mg po qd as per Nephro recs   -first session of HD given yest successfully   -2nd session of HD today  -F/u w/ SW regarding outpatient HD  -F/u Hep B, Hep C, and quantiferon results -Sent into hospital by nephrologist Dr. Paul Serrato for urgent HD since pt having insurance issues to get HD set up outpatient   -CKD 5 likely sec to ?polycystic kidney disease/one atrophic kidney from unknown cause  -AVF on L, not matured. r chest wall permacath placed by vascular surgery last month on 8/19  -admitting labs: Crt 58, Crt 7.3, pH on VBG 7.24  -Nephro recs appreciated   -Started Lasix 80mg po qd as per Nephro recs   -first session of HD given yest successfully   -2nd session of HD today  -F/u w/ SW regarding outpatient HD  -F/u Hep B, Hep C, and quantiferon results  -Vitamin D decreased and PTH elevated. Will continue to monitor -Sent into hospital by nephrologist Dr. Paul Serrato for urgent HD since pt having insurance issues to get HD set up outpatient and for worsening uremia and acidosis.   -CKD 5 likely sec to ?polycystic kidney disease/one atrophic kidney from unknown cause  -AVF on L, not matured. r chest wall permacath placed by vascular surgery last month on 8/19  -admitting labs: Crt 58, Crt 7.3, pH on VBG 7.24  -Nephro recs appreciated   -Started Lasix 80mg po qd as per Nephro recs   -first session of HD given yest successfully   -2nd session of HD today  -F/u w/ SW regarding outpatient HD  -F/u Hep B, Hep C, and quantiferon results  -Vitamin D decreased and PTH elevated. Will continue to monitor

## 2020-09-02 NOTE — DISCHARGE NOTE PROVIDER - CARE PROVIDER_API CALL
Lulú Serrato)  Internal Medicine; Nephrology  [id number] [address] [place] [address]  Phone: [phone]  Fax: [phone]  Follow Up Time:

## 2020-09-02 NOTE — DISCHARGE NOTE PROVIDER - NSDCCPCAREPLAN_GEN_ALL_CORE_FT
PRINCIPAL DISCHARGE DIAGNOSIS  Diagnosis: ESRD (end stage renal disease)  Assessment and Plan of Treatment: Chronic kidney disease (CKD) is when the kidneys stop working as well as they should. When they are working normally, the kidneys filter the blood and remove waste and excess salt and water. In people with CKD, the kidneys slowly lose the ability to filter the blood. In time, the kidneys can stop working completely. That is why it is so important to keep CKD from getting worse. People in the early stages of CKD can take medicines to keep the disease from getting worse. For example, many people with CKD should take medicines known as "ACE inhibitors" or "angiotensin receptor blockers." If your doctor or nurse prescribes these medicines, it is very important that you take them every day as directed. If they cause side effects or cost too much, speak to your doctor or nurse about it. He or she might have solutions to offer. If your kidneys stop working completely, you can choose between 3 different treatments to take over the job of your kidneys. Your choices are the folowing: You can have kidney transplant surgery. That way, the new kidney can do the job of your own kidneys. If you have a kidney transplant, you will need to take medicines for the rest of your life to keep your body from reacting badly to the new kidney. (You only need 1 kidney to live.) You can have your blood filtered by a machine. This treatment is called "hemodialysis," but many people call it just "dialysis." If you choose this approach, you will need to be hooked up to the machine at least 3 times a week for a few hours for the rest of your life. Before you start, you will also need to have surgery to prepare a blood vessel for attachment to the machine. You can learn to use a special fluid that has to be piped in and out of your belly every day. This treatment is called "peritoneal dialysis." If you choose this type of dialysis, you will need surgery to have a tube implanted in your belly. Then you will have to learn how to pipe the fluid in and out through that tube.

## 2020-09-02 NOTE — DISCHARGE NOTE PROVIDER - HOSPITAL COURSE
35y female with a prior medical history of polycystic kidney disease with one atrophic kidney (pt was unsure which kidney is atrophic), hypothyroidism, and depression presented to hospital for need for urgent HD per her nephrologist. Pt lab's notable for pH of 7.24 on VBG and BUN 58, Crt 7.3 on BMP. She had a R chest wall permacath placed by vascular surgery on 8/19. This was used for access. Nephrology was consulted and she received 3 sessions. Pt also noted to have secondary hyperparathyroidism, but per nephro deferred binder requirement. SW was consulted to setup HD as an outpatient (patient desired going [facility] at [address] Lulú Serrato). 35y female with a prior medical history of polycystic kidney disease with one atrophic kidney (pt was unsure which kidney is atrophic), hypothyroidism, and depression presented to hospital for need for urgent HD per her nephrologist. Pt lab's notable for pH of 7.24 on VBG and BUN 58, Crt 7.3 on BMP. She had a R chest wall permacath placed by vascular surgery on 8/19. This was used for access. Nephrology was consulted and she received 3 sessions. Pt also noted to have secondary hyperparathyroidism, but per nephro deferred binder requirement. SW was consulted to setup HD as an outpatient (patient desired going [facility] at [address] [place] [address] Dr Lulú Serrato). Throughout her stay, patient received 3 HD sessions and her symptoms dramatically improved. Patient no longer complains of fatigue or SOB. Patient had slight tingling and numbness of her left thenar eminence. Vascular surgery was consulted and stated there was possibility that some sensory nerve branches involved in surgery are now re-growing or causing tingling sensation. They recommended no intervention. Patient's numbness/tingling improved the following day. Throughout her stay, the patient's BUN and creatinine have been steadily downtrending. Patient is now well and not complaining of any acute symptoms. She is medically stable and optimized for discharge. She should have outpatient HD sessions 3 times a week at [facility] on [address] [place] [address]. 35y female with a prior medical history of polycystic kidney disease with one atrophic kidney (pt was unsure which kidney is atrophic), hypothyroidism, and depression presented to hospital for need for urgent HD per her nephrologist. Pt lab's notable for pH of 7.24 on VBG and BUN 58, Crt 7.3 on BMP. She had a R chest wall permacath placed by vascular surgery on 8/19. This was used for access. Nephrology was consulted and she received 3 sessions. Pt also noted to have secondary hyperparathyroidism, but per nephro deferred binder requirement. SW was consulted to setup HD as an outpatient (patient desired going [facility] at [address] [place] [address] Dr Lulú Serrato). Throughout her stay, patient received 3 HD sessions and her symptoms dramatically improved. Patient no longer complains of fatigue or SOB. Patient had slight tingling and numbness of her left thenar eminence. Vascular surgery was consulted and stated there was possibility that some sensory nerve branches involved in surgery are now re-growing or causing tingling sensation. They recommended no intervention. Patient's numbness/tingling improved the following day. Patient also had an elevated TSH during her stay. She was told how to take her Synthroid medication correctly, as she had been taking it with food previously and only started taking the medication recently. Throughout her stay, the patient's BUN and creatinine have been steadily downtrending. Patient is now well and not complaining of any acute symptoms. She is medically stable and optimized for discharge. She should have outpatient HD sessions 3 times a week at [facility] on [address] [place] [address].

## 2020-09-02 NOTE — PROGRESS NOTE ADULT - PROBLEM SELECTOR PLAN 1
-Sent into hospital by nephrologist Dr. Paul Serrato for urgent HD since pt having insurance issues to get HD set up outpatient   -CKD 5 likely sec to ?polycystic kidney disease/one atrophic kidney from unknown cause  -AVF on L, not matured. r chest wall permacath placed by vascular surgery last month on   -admitting labs: BUN 58, Crt 7.3, pH on VBG 7.24; today: BUN: 32, Crt: 4.96, pH on VB.34  -Nephro consulted, as per nephro recs: start lasix 80 today and d/c sodium bicarbonate; to get 2nd HD session today.

## 2020-09-02 NOTE — PROGRESS NOTE ADULT - SUBJECTIVE AND OBJECTIVE BOX
Authored by Annamarie Mena, MS3      MUQSIT, ATIQA  35y  Female      Patient is a 35y old  Female who presents with a chief complaint of Needs dialysis (02 Sep 2020 13:04)      OVERNIGHT EVENTS: Patient felt well overnight.  Tolerated dialysis well, and states that she feels less anxious after one round of dialysis.  Reports a decreased appetite overnight.  Patient denies nausea and vomiting but endorses one episode of non bloody diarrhea this am.  Urinating well.  Reports a slight headache this morning.      REVIEW OF SYSTEMS:  CONSTITUTIONAL: No fever, weight loss, or fatigue  RESPIRATORY: No cough, wheezing, chills or hemoptysis; No shortness of breath  CARDIOVASCULAR: No chest pain, palpitations, or dizziness  GASTROINTESTINAL: No abdominal or epigastric pain.   GENITOURINARY: No dysuria, frequency, hematuria, or incontinence  PSYCHIATRIC: No anxiety, mood swings, or difficulty sleeping    FAMILY HISTORY:  Family history of polycystic kidney    T(C): 37.1 (09-02-20 @ 12:20), Max: 37.1 (09-02-20 @ 12:20)  HR: 71 (09-02-20 @ 12:20) (70 - 80)  BP: 151/92 (09-02-20 @ 12:20) (114/77 - 151/92)  RR: 19 (09-02-20 @ 12:20) (17 - 20)  SpO2: 99% (09-02-20 @ 12:20) (94% - 100%)  Wt(kg): --Vital Signs Last 24 Hrs  T(C): 37.1 (02 Sep 2020 12:20), Max: 37.1 (02 Sep 2020 12:20)  T(F): 98.7 (02 Sep 2020 12:20), Max: 98.7 (02 Sep 2020 12:20)  HR: 71 (02 Sep 2020 12:20) (70 - 80)  BP: 151/92 (02 Sep 2020 12:20) (114/77 - 151/92)  BP(mean): --  RR: 19 (02 Sep 2020 12:20) (17 - 20)  SpO2: 99% (02 Sep 2020 12:20) (94% - 100%)  No Known Allergies      PHYSICAL EXAM:  GENERAL: NAD, resting comfortably in bed  NERVOUS SYSTEM:  Alert & Oriented X3, Good concentration;  CHEST/LUNG: Clear to percussion bilaterally; No rales, rhonchi, wheezing, or rubs  HEART: Regular rate and rhythm; No murmurs, rubs, or gallops  ABDOMEN: Soft, Nontender, Nondistended  EXTREMITIES:  2+ Peripheral Pulses, No clubbing or cyanosis, mild LE edema  LYMPH: No lymphadenopathy noted  SKIN: No rashes or lesions    Consultant(s) Notes Reviewed:  [x ] YES  [ ] NO  Care Discussed with Consultants/Other Providers [ x] YES  [ ] NO    LABS:                          8.7    7.05  )-----------( 285      ( 02 Sep 2020 07:12 )             27.6       09-02    142  |  107  |  32<H>  ----------------------------<  73  3.7   |  20<L>  |  4.96<H>    Ca    8.8      02 Sep 2020 07:12  Phos  5.3     09-02  Mg     1.9     09-02    TPro  6.8  /  Alb  3.6  /  TBili  0.2  /  DBili  x   /  AST  21  /  ALT  8<L>  /  AlkPhos  107  09-01              RADIOLOGY & ADDITIONAL TESTS:    Imaging Personally Reviewed:  [ ] YES  [ ] NO  acetaminophen   Tablet .. 650 milliGRAM(s) Oral every 6 hours PRN  cholecalciferol 2000 Unit(s) Oral daily  FLUoxetine 10 milliGRAM(s) Oral daily  furosemide    Tablet 80 milliGRAM(s) Oral daily  heparin   Injectable 5000 Unit(s) SubCutaneous every 8 hours  influenza   Vaccine 0.5 milliLiter(s) IntraMuscular once  levothyroxine 25 MICROGram(s) Oral daily  polyethylene glycol 3350 17 Gram(s) Oral daily PRN      HEALTH ISSUES - PROBLEM Dx:  Renal bone disease: Renal bone disease  Metabolic acidosis: Metabolic acidosis  Hypertension: Hypertension  CKD (chronic kidney disease), stage V: CKD (chronic kidney disease), stage V  Discharge planning issues: Discharge planning issues  Prophylactic measure: Prophylactic measure  Hypothyroidism, unspecified type: Hypothyroidism, unspecified type  Obsessive-compulsive disorder, unspecified type: Obsessive-compulsive disorder, unspecified type  Anemia due to blood loss: Anemia due to blood loss  AVF (arteriovenous fistula): AVF (arteriovenous fistula)  Polycystic kidney disease: Polycystic kidney disease

## 2020-09-02 NOTE — PROGRESS NOTE ADULT - PROBLEM SELECTOR PLAN 6
-Diet: renal restricted  -DVT ppx: hep subq TSH 11.1 today.   -c/w Levothyroxine  -Will need endo f/u outpatient   -Continue to monitor for signs of hypothyroidism TSH 11.1 today.   -c/w Levothyroxine  -Will need endo f/u outpatient   -Continue to monitor for signs of hypothyroidism  -F/u T3 and T4.

## 2020-09-02 NOTE — PROGRESS NOTE ADULT - ATTENDING COMMENTS
Feeling improved  1.  ESRD--HD chronic initiated.  Tolerating with good clearance via catheter and AV access maturing  2.  Acidosis--d/c NaHCO3 supplementation  3.  HyperPO4--trend.  May need binder adjustment as appetite improves

## 2020-09-03 LAB
ANION GAP SERPL CALC-SCNC: 20 MMOL/L — HIGH (ref 5–17)
BUN SERPL-MCNC: 26 MG/DL — HIGH (ref 7–23)
CALCIUM SERPL-MCNC: 9.3 MG/DL — SIGNIFICANT CHANGE UP (ref 8.4–10.5)
CHLORIDE SERPL-SCNC: 102 MMOL/L — SIGNIFICANT CHANGE UP (ref 96–108)
CO2 SERPL-SCNC: 21 MMOL/L — LOW (ref 22–31)
CREAT SERPL-MCNC: 4.64 MG/DL — HIGH (ref 0.5–1.3)
GLUCOSE SERPL-MCNC: 68 MG/DL — LOW (ref 70–99)
HBV CORE IGM SER-ACNC: SIGNIFICANT CHANGE UP
HBV SURFACE AB SER-ACNC: 572.4 MIU/ML — SIGNIFICANT CHANGE UP
HCT VFR BLD CALC: 31.2 % — LOW (ref 34.5–45)
HGB BLD-MCNC: 10.1 G/DL — LOW (ref 11.5–15.5)
MAGNESIUM SERPL-MCNC: 2 MG/DL — SIGNIFICANT CHANGE UP (ref 1.6–2.6)
MCHC RBC-ENTMCNC: 28.7 PG — SIGNIFICANT CHANGE UP (ref 27–34)
MCHC RBC-ENTMCNC: 32.4 GM/DL — SIGNIFICANT CHANGE UP (ref 32–36)
MCV RBC AUTO: 88.6 FL — SIGNIFICANT CHANGE UP (ref 80–100)
NRBC # BLD: 0 /100 WBCS — SIGNIFICANT CHANGE UP (ref 0–0)
PHOSPHATE SERPL-MCNC: 4.4 MG/DL — SIGNIFICANT CHANGE UP (ref 2.5–4.5)
PLATELET # BLD AUTO: 331 K/UL — SIGNIFICANT CHANGE UP (ref 150–400)
POTASSIUM SERPL-MCNC: 3.6 MMOL/L — SIGNIFICANT CHANGE UP (ref 3.5–5.3)
POTASSIUM SERPL-SCNC: 3.6 MMOL/L — SIGNIFICANT CHANGE UP (ref 3.5–5.3)
RBC # BLD: 3.52 M/UL — LOW (ref 3.8–5.2)
RBC # FLD: 14.2 % — SIGNIFICANT CHANGE UP (ref 10.3–14.5)
SARS-COV-2 RNA SPEC QL NAA+PROBE: SIGNIFICANT CHANGE UP
SODIUM SERPL-SCNC: 143 MMOL/L — SIGNIFICANT CHANGE UP (ref 135–145)
T3FREE SERPL-MCNC: 2.89 PG/ML — SIGNIFICANT CHANGE UP (ref 1.8–4.6)
T4 FREE SERPL-MCNC: 1.7 NG/DL — SIGNIFICANT CHANGE UP (ref 0.9–1.8)
WBC # BLD: 9.33 K/UL — SIGNIFICANT CHANGE UP (ref 3.8–10.5)
WBC # FLD AUTO: 9.33 K/UL — SIGNIFICANT CHANGE UP (ref 3.8–10.5)

## 2020-09-03 PROCEDURE — 99232 SBSQ HOSP IP/OBS MODERATE 35: CPT | Mod: GC

## 2020-09-03 RX ORDER — ZOLPIDEM TARTRATE 10 MG/1
5 TABLET ORAL AT BEDTIME
Refills: 0 | Status: DISCONTINUED | OUTPATIENT
Start: 2020-09-03 | End: 2020-09-04

## 2020-09-03 RX ADMIN — HEPARIN SODIUM 5000 UNIT(S): 5000 INJECTION INTRAVENOUS; SUBCUTANEOUS at 05:04

## 2020-09-03 RX ADMIN — Medication 25 MICROGRAM(S): at 05:04

## 2020-09-03 RX ADMIN — HEPARIN SODIUM 5000 UNIT(S): 5000 INJECTION INTRAVENOUS; SUBCUTANEOUS at 12:57

## 2020-09-03 RX ADMIN — Medication 2000 UNIT(S): at 12:57

## 2020-09-03 RX ADMIN — ZOLPIDEM TARTRATE 5 MILLIGRAM(S): 10 TABLET ORAL at 21:40

## 2020-09-03 RX ADMIN — Medication 10 MILLIGRAM(S): at 12:57

## 2020-09-03 RX ADMIN — Medication 80 MILLIGRAM(S): at 05:04

## 2020-09-03 NOTE — PROGRESS NOTE ADULT - PROBLEM SELECTOR PLAN 1
-Sent into hospital by nephrologist Dr. Paul Serrato for urgent HD since pt having insurance issues to get HD set up outpatient and for worsening uremia and acidosis.   -CKD 5 likely sec to ?polycystic kidney disease/one atrophic kidney from unknown cause  -AVF on L, not matured. r chest wall permacath placed by vascular surgery last month on 8/19  -admitting labs: Crt 58, Crt 7.3, pH on VBG 7.24  -Nephro recs appreciated   -Started Lasix 80mg po qd as per Nephro recs   -c/w HD per renal   -F/u w/ SW regarding outpatient HD  -F/u Hep B, Hep C, and quantiferon results  -Vitamin D decreased and PTH elevated. Will continue to monitor.

## 2020-09-03 NOTE — PROGRESS NOTE ADULT - ATTENDING COMMENTS
Tolerating initiation of HD.  Placement efforts for outpt management ongoing  1.  ESRD--HD initiation and TIW  2.  HyperPO4--trend for binder initiation

## 2020-09-03 NOTE — PROGRESS NOTE ADULT - PROBLEM SELECTOR PLAN 3
-Created 8/19 by vascular surgery, L radial cephalic  -No pain at site.   -Pt reports numbness at site -Created 8/19 by vascular surgery, L radial cephalic  -Pt reports numbness at L thumb distal to AVF site.    -To consult vascular surgery for evaluation of fistula

## 2020-09-03 NOTE — PROGRESS NOTE ADULT - PROBLEM SELECTOR PLAN 1
-Sent into hospital by nephrologist Dr. Paul Serrato for urgent HD since pt having insurance issues to get HD set up outpatient and for worsening uremia and acidosis.   -CKD 5 likely sec to ?polycystic kidney disease/one atrophic kidney from unknown cause  -AVF on L, not matured. r chest wall permacath placed by vascular surgery last month on 8/19  -admitting labs: Crt 58, Crt 7.3, pH on VBG 7.24  -Nephro recs appreciated   -Started Lasix 80mg po qd as per Nephro recs   -c/w HD per renal   -F/u w/ SW regarding outpatient HD  -F/u Hep B, Hep C, and quantiferon results  -Vitamin D decreased and PTH elevated. Will continue to monitor -Sent into hospital by nephrologist Dr. Paul Serrato for urgent HD since pt having insurance issues to get HD set up outpatient and for worsening uremia and acidosis.   -CKD 5 likely sec to ?polycystic kidney disease/one atrophic kidney from unknown cause  -AVF on L, not yet matured. r chest wall permacath placed by vascular surgery last month on 8/19  -admitting labs: Crt 58, Crt 7.3, pH on VBG 7.24  -Nephro recs appreciated   -Started Lasix 80mg po qd as per Nephro recs   -c/w HD per renal   -F/u w/ SW regarding outpatient HD  -F/u Hep B, Hep C, and quantiferon results  -Vitamin D decreased and PTH elevated. Will continue to monitor

## 2020-09-03 NOTE — CONSULT NOTE ADULT - ASSESSMENT
Pt is a 36yo F with PKD and recently started HD, had AVF creation approximately 2 weeks ago with Dr. Gomes, vascular consulted for concern of slight tingling around L thumb    - fistula with good thrill, thumb and hand with no concern of neurovascular compromise   - possibility that some sensory nerve branches involved in surgery are now re-growing or causing tingling sensation  - no intervention needed, patient reassured and questions addressed  - vascular will sign off  - pt can follow up with Dr. Gomes as an outpatient as needed    Vascular Surgery

## 2020-09-03 NOTE — PROGRESS NOTE ADULT - PROBLEM SELECTOR PLAN 6
TSH 11.1   -c/w Levothyroxine  -Will need endo f/u outpatient   -Continue to monitor for signs of hypothyroidism  -F/u T3 and T4.

## 2020-09-03 NOTE — DIETITIAN INITIAL EVALUATION ADULT. - OTHER INFO
Patient reports poor appetite and PO intake, endorses consuming ~10% of meals in-house (admitted 9/1). Endorses nausea during dialysis today, now resolved, no vomiting. Reports loose BMs today (9/3). Denies difficulties chewing/swallowing. Confirmed NKFA.     Patient reports decreased appetite and PO intake PTA - unsure how much less she was eating. Reports following a vegetarian diet at home. Denies recent weight changes PTA, states  pounds. Lowest in-house weight (9/3 post-HD) suggest weight stability. Will continue to monitor and trend weights. Denies vitamin/mineral supplementation PTA.     Encouraged PO intake as tolerated with emphasis on protein. Discussed with patient importance of adequate calorie and protein intake in setting of hemodialysis treatment. Provided new Hemodialysis diet education to patient with educational handouts. Educated patient on renal diet restrictions of sodium, phosphorus, and potassium and explained rationale behind restrictions. Discussed with patient availability of oral nutrition supplement (Nepro) to optimize calorie and protein intake, patient declined at this time, would like to "focus on whole foods." Patient amenable to recommendations; made aware RD remains available.

## 2020-09-03 NOTE — CONSULT NOTE ADULT - SUBJECTIVE AND OBJECTIVE BOX
VASCULAR SURGERY CONSULT NOTE    Patient is a 35y old female who presents with a chief complaint of needing dialysis (03 Sep 2020 10:32)    HPI:  36yo female with a prior medical history of polycystic kidney disease with one atrophic kidney (pt was unsure which kidney is atrophic), hypothyroidism, and depression presenting for initiation of dialysis as per her nephrologist. Patient states that she has had progressive fatigue and difficulty concentrating for the past 5 months. On 20, patient had placement of Permacath in R chest and an AV fistula in her L wrist with Dr. Scott Gomes, her vascular surgeon. She states that she has not experienced any chest pain but does endorse shortness of breath on exertion. Patient endorses normal urination without dysuria or hematuria. Patient also endorses a recent large volume of nausea and vomiting, which stopped with her surgery on .      Social Hx: smoker on and off for 12 years, ~2 cigarettes daily; has not smoked since her surgery on .  FamHx: father - diabetes requiring dialysis, kidney stones (01 Sep 2020 14:40)    PAST MEDICAL & SURGICAL HISTORY:  OCD (obsessive compulsive disorder)  Attention deficit hyperactivity disorder (ADHD)  Polycystic kidney disease  H/O 3 abortions  History of fracture of tibia: 20 years ago- Left leg    MEDICATIONS  (STANDING):  cholecalciferol 2000 Unit(s) Oral daily  FLUoxetine 10 milliGRAM(s) Oral daily  furosemide    Tablet 80 milliGRAM(s) Oral daily  heparin   Injectable 5000 Unit(s) SubCutaneous every 8 hours  influenza   Vaccine 0.5 milliLiter(s) IntraMuscular once  levothyroxine 25 MICROGram(s) Oral daily    MEDICATIONS  (PRN):  acetaminophen   Tablet .. 650 milliGRAM(s) Oral every 6 hours PRN Mild Pain (1 - 3), Moderate Pain (4 - 6)  polyethylene glycol 3350 17 Gram(s) Oral daily PRN Constipation    Allergies    No Known Allergies    Intolerances        Vital Signs Last 24 Hrs  T(C): 36.7 (03 Sep 2020 11:45), Max: 36.9 (02 Sep 2020 20:59)  T(F): 98.1 (03 Sep 2020 11:45), Max: 98.4 (02 Sep 2020 20:59)  HR: 74 (03 Sep 2020 11:45) (71 - 86)  BP: 120/65 (03 Sep 2020 11:45) (113/75 - 156/94)  BP(mean): --  RR: 18 (03 Sep 2020 11:45) (18 - 18)  SpO2: 100% (03 Sep 2020 11:45) (97% - 100%)  Daily     Daily Weight in k.1 (03 Sep 2020 14:47)    Exam:    Neuro: Alert  GA: well-appearing  Pulm: breathing comfortably  Ext: L wrist fistula with                           10.1   9.33  )-----------( 331      ( 03 Sep 2020 10:05 )             31.2     09-03    143  |  102  |  26<H>  ----------------------------<  68<L>  3.6   |  21<L>  |  4.64<H>    Ca    9.3      03 Sep 2020 10:05  Phos  4.4     09-03  Mg     2.0     09-03            IMAGING STUDIES: VASCULAR SURGERY CONSULT NOTE    Patient is a 35y old female who presents with a chief complaint of needing dialysis (03 Sep 2020 10:32)    HPI:  36yo female with a prior medical history of polycystic kidney disease with one atrophic kidney (pt was unsure which kidney is atrophic), hypothyroidism, and depression presenting for initiation of dialysis as per her nephrologist. Patient states that she has had progressive fatigue and difficulty concentrating for the past 5 months. On 20, patient had placement of Permacath in R chest and an AV fistula in her L wrist with Dr. Scott Gomes, her vascular surgeon. She states that she has not experienced any chest pain but does endorse shortness of breath on exertion. Patient endorses normal urination without dysuria or hematuria. Patient also endorses a recent large volume of nausea and vomiting, which stopped with her surgery on .      Social Hx: smoker on and off for 12 years, ~2 cigarettes daily; has not smoked since her surgery on .  FamHx: father - diabetes requiring dialysis, kidney stones (01 Sep 2020 14:40)    PAST MEDICAL & SURGICAL HISTORY:  OCD (obsessive compulsive disorder)  Attention deficit hyperactivity disorder (ADHD)  Polycystic kidney disease  H/O 3 abortions  History of fracture of tibia: 20 years ago- Left leg    MEDICATIONS  (STANDING):  cholecalciferol 2000 Unit(s) Oral daily  FLUoxetine 10 milliGRAM(s) Oral daily  furosemide    Tablet 80 milliGRAM(s) Oral daily  heparin   Injectable 5000 Unit(s) SubCutaneous every 8 hours  influenza   Vaccine 0.5 milliLiter(s) IntraMuscular once  levothyroxine 25 MICROGram(s) Oral daily    MEDICATIONS  (PRN):  acetaminophen   Tablet .. 650 milliGRAM(s) Oral every 6 hours PRN Mild Pain (1 - 3), Moderate Pain (4 - 6)  polyethylene glycol 3350 17 Gram(s) Oral daily PRN Constipation    Allergies    No Known Allergies    Intolerances        Vital Signs Last 24 Hrs  T(C): 36.7 (03 Sep 2020 11:45), Max: 36.9 (02 Sep 2020 20:59)  T(F): 98.1 (03 Sep 2020 11:45), Max: 98.4 (02 Sep 2020 20:59)  HR: 74 (03 Sep 2020 11:45) (71 - 86)  BP: 120/65 (03 Sep 2020 11:45) (113/75 - 156/94)  BP(mean): --  RR: 18 (03 Sep 2020 11:45) (18 - 18)  SpO2: 100% (03 Sep 2020 11:45) (97% - 100%)  Daily     Daily Weight in k.1 (03 Sep 2020 14:47)    Exam:    Neuro: Alert  GA: well-appearing  Pulm: breathing comfortably  Ext: L wrist fistula with palpable thrill, steri-strips in place, cap refill in thumb <2 sec, SILT but slightly duller than in ulnar digits                          10.1   9.33  )-----------( 331      ( 03 Sep 2020 10:05 )             31.2     09-03    143  |  102  |  26<H>  ----------------------------<  68<L>  3.6   |  21<L>  |  4.64<H>    Ca    9.3      03 Sep 2020 10:05  Phos  4.4     09-03  Mg     2.0     09-03            IMAGING STUDIES:    None

## 2020-09-03 NOTE — PROGRESS NOTE ADULT - PROBLEM SELECTOR PLAN 4
-Presented with Hb 8.2  -Likely in context of recent menstrual cycle, which ended 8/31. Per pt, she has heavy bleeding  -Monitor with CBC qd  -Iron studies wnl

## 2020-09-03 NOTE — PROGRESS NOTE ADULT - PROBLEM SELECTOR PLAN 6
-TSH 11.1   -c/w Levothyroxine  -Will need endo f/u outpatient   -Continue to monitor for signs of hypothyroidism  -F/u T3 and T4. -TSH 11.1   -c/w Levothyroxine; patient had been taking medication with breakfast - counseled patient on appropriate way of taking levothyroxine (first thing in am, on an empty stomach)  -Will need endo f/u outpatient   -Continue to monitor for signs of hypothyroidism  -F/u T3 and T4.

## 2020-09-03 NOTE — PROGRESS NOTE ADULT - SUBJECTIVE AND OBJECTIVE BOX
Authored by Annamarie Mena, MS3    MUQSIT, ATIQA  35y  Female      Patient is a 35y old  Female who presents with a chief complaint of Needs dialysis (02 Sep 2020 17:10)      INTERVAL HPI/OVERNIGHT EVENTS: Patient seen and examined.  Patient states that she is feeling much better and has had more energy after receiving dialysis.  She reports that she experienced some nausea during and shortly after receiving dialysis, but that this nausea was self-limiting and went away promptly.  Patient reports difficulty sleeping last night for which she requested ambien, states that she takes 10mg ambien at home and was only given 5mg yesterday.  She is also reporting some numbness on her L thumb distal to her AV fistula site, but states that she has had this sensation since the fistula was created.  Patient reports 2 episodes of non bloody diarrhea yesterday.  Patient is urinating her normal volume with no dysuria or hematuria.      REVIEW OF SYSTEMS:  CONSTITUTIONAL: No fever or fatigue  RESPIRATORY: No cough, no shortness of breath  CARDIOVASCULAR: No chest pain, palpitations, or dizziness  GASTROINTESTINAL: No abdominal or epigastric pain. No nausea, vomiting, or hematemesis;  GENITOURINARY: No dysuria, frequency, hematuria, or incontinence  NEUROLOGICAL: No headaches    FAMILY HISTORY:  Family history of polycystic kidney    T(C): 36.7 (09-03-20 @ 04:11), Max: 37.1 (09-02-20 @ 12:20)  HR: 78 (09-03-20 @ 04:11) (70 - 78)  BP: 113/75 (09-03-20 @ 04:11) (113/75 - 151/92)  RR: 18 (09-03-20 @ 04:11) (18 - 20)  SpO2: 97% (09-03-20 @ 04:11) (94% - 99%)  Wt(kg): --Vital Signs Last 24 Hrs  T(C): 36.7 (03 Sep 2020 04:11), Max: 37.1 (02 Sep 2020 12:20)  T(F): 98.1 (03 Sep 2020 04:11), Max: 98.7 (02 Sep 2020 12:20)  HR: 78 (03 Sep 2020 04:11) (70 - 78)  BP: 113/75 (03 Sep 2020 04:11) (113/75 - 151/92)  BP(mean): --  RR: 18 (03 Sep 2020 04:11) (18 - 20)  SpO2: 97% (03 Sep 2020 04:11) (94% - 99%)  No Known Allergies      PHYSICAL EXAM:  GENERAL: NAD, resting comfortably in bed  NERVOUS SYSTEM:  Alert & Oriented X3, Good concentration  CHEST/LUNG: Clear to percussion bilaterally; No rales, rhonchi, wheezing, or rubs  HEART: Regular rate and rhythm; No murmurs, rubs, or gallops  ABDOMEN: Soft, Nontender, Nondistended  EXTREMITIES:  2+ Peripheral Pulses, Mild lower extremity edema  LYMPH: No lymphadenopathy noted  SKIN: No rashes or lesions    Consultant(s) Notes Reviewed:  [x ] YES  [ ] NO  Care Discussed with Consultants/Other Providers [ x] YES  [ ] NO    LABS:                          8.7    7.05  )-----------( 285      ( 02 Sep 2020 07:12 )             27.6       09-02    142  |  107  |  32<H>  ----------------------------<  73  3.7   |  20<L>  |  4.96<H>    Ca    8.8      02 Sep 2020 07:12  Phos  5.3     09-02  Mg     1.9     09-02    TPro  6.8  /  Alb  3.6  /  TBili  0.2  /  DBili  x   /  AST  21  /  ALT  8<L>  /  AlkPhos  107  09-01              RADIOLOGY & ADDITIONAL TESTS:    Imaging Personally Reviewed:  [ ] YES  [ ] NO  acetaminophen   Tablet .. 650 milliGRAM(s) Oral every 6 hours PRN  cholecalciferol 2000 Unit(s) Oral daily  FLUoxetine 10 milliGRAM(s) Oral daily  furosemide    Tablet 80 milliGRAM(s) Oral daily  heparin   Injectable 5000 Unit(s) SubCutaneous every 8 hours  influenza   Vaccine 0.5 milliLiter(s) IntraMuscular once  levothyroxine 25 MICROGram(s) Oral daily  polyethylene glycol 3350 17 Gram(s) Oral daily PRN      HEALTH ISSUES - PROBLEM Dx:  Renal bone disease: Renal bone disease  Metabolic acidosis: Metabolic acidosis  Hypertension: Hypertension  CKD (chronic kidney disease), stage V: CKD (chronic kidney disease), stage V  Discharge planning issues: Discharge planning issues  Prophylactic measure: Prophylactic measure  Hypothyroidism, unspecified type: Hypothyroidism, unspecified type  Obsessive-compulsive disorder, unspecified type: Obsessive-compulsive disorder, unspecified type  Anemia due to blood loss: Anemia due to blood loss  AVF (arteriovenous fistula): AVF (arteriovenous fistula)  Polycystic kidney disease: Polycystic kidney disease

## 2020-09-03 NOTE — PROGRESS NOTE ADULT - PROBLEM SELECTOR PLAN 3
Renal bone disease 2/2 advanced CKD, serum phos 5.3  - Low phosphorus diet advised. Monitor serum phosphorus

## 2020-09-03 NOTE — DIETITIAN INITIAL EVALUATION ADULT. - ADD RECOMMEND
1) Suggest Nephrovite supplementation if no medical contraindications 2) Encouraged PO intake as tolerated with emphasis on protein. Hemodialysis diet education provided with handouts. Patient made aware RD remains available. 3) Monitor PO intake, weight, labs, skin, GI status, diet

## 2020-09-03 NOTE — PROGRESS NOTE ADULT - SUBJECTIVE AND OBJECTIVE BOX
Nicole Chan MD  PGY 1 Department of Internal Medicine  Pager: [phone] ([facility])   Pager: [phone] ([facility])    Patient is a 35y old  Female who presents with a chief complaint of Needs dialysis (02 Sep 2020 17:10)      SUBJECTIVE / OVERNIGHT EVENTS: Pt seen and examined. No acute overnight events.     MEDICATIONS  (STANDING):  cholecalciferol 2000 Unit(s) Oral daily  FLUoxetine 10 milliGRAM(s) Oral daily  furosemide    Tablet 80 milliGRAM(s) Oral daily  heparin   Injectable 5000 Unit(s) SubCutaneous every 8 hours  influenza   Vaccine 0.5 milliLiter(s) IntraMuscular once  levothyroxine 25 MICROGram(s) Oral daily    MEDICATIONS  (PRN):  acetaminophen   Tablet .. 650 milliGRAM(s) Oral every 6 hours PRN Mild Pain (1 - 3), Moderate Pain (4 - 6)  polyethylene glycol 3350 17 Gram(s) Oral daily PRN Constipation      I&O's Summary    02 Sep 2020 07:01  -  03 Sep 2020 07:00  --------------------------------------------------------  IN: 130 mL / OUT: 0 mL / NET: 130 mL        Vital Signs Last 24 Hrs  T(C): 36.7 (03 Sep 2020 04:11), Max: 37.1 (02 Sep 2020 12:20)  T(F): 98.1 (03 Sep 2020 04:11), Max: 98.7 (02 Sep 2020 12:20)  HR: 78 (03 Sep 2020 04:11) (70 - 78)  BP: 113/75 (03 Sep 2020 04:11) (113/75 - 151/92)  BP(mean): --  RR: 18 (03 Sep 2020 04:11) (18 - 20)  SpO2: 97% (03 Sep 2020 04:11) (94% - 99%)    CAPILLARY BLOOD GLUCOSE          PHYSICAL EXAM:  GENERAL: No acute distress, well-developed  HEAD:  Atraumatic, Normocephalic  CHEST/LUNG: CTAB; No wheezes, rales, or rhonchi  HEART: Regular rate and rhythm; No murmurs, rubs, or gallops  ABDOMEN: Soft, non-tender, non-distended; normal bowel sounds, no organomegaly  EXTREMITIES:  No bleeding from L wrist, but bounding pulse present. 2+ peripheral pulses b/l, No edema b/l  NEUROLOGY: A&O x 3, no focal deficits  SKIN: No rashes or lesions      LABS:                        8.7    7.05  )-----------( 285      ( 02 Sep 2020 07:12 )             27.6     Auto Eosinophil # x     / Auto Eosinophil % x     / Auto Neutrophil # x     / Auto Neutrophil % x     / BANDS % x                            8.2    9.41  )-----------( 291      ( 01 Sep 2020 12:03 )             26.8     Auto Eosinophil # 0.42  / Auto Eosinophil % 4.5   / Auto Neutrophil # 5.94  / Auto Neutrophil % 63.2  / BANDS % x        09-02    142  |  107  |  32<H>  ----------------------------<  73  3.7   |  20<L>  |  4.96<H>  09-01    143  |  114<H>  |  58<H>  ----------------------------<  101<H>  4.3   |  14<L>  |  7.30<H>    Ca    8.8      02 Sep 2020 07:12  Mg     1.9     09-02  Phos  5.3     09-02  TPro  6.8  /  Alb  3.6  /  TBili  0.2  /  DBili  x   /  AST  21  /  ALT  8<L>  /  AlkPhos  107  09-01                  RADIOLOGY & ADDITIONAL TESTS:    Imaging Personally Reviewed:    Consultant(s) Notes Reviewed:      Care Discussed with Consultants/Other Providers: Nicole Chan MD  PGY 1 Department of Internal Medicine  Pager: [phone] ([facility])   Pager: [phone] ([facility])    Patient is a 35y old  Female who presents with a chief complaint of Needs dialysis (02 Sep 2020 17:10)      SUBJECTIVE / OVERNIGHT EVENTS: Pt seen and examined. No acute overnight events. Patient states that she is feeling much better and has had more energy after receiving dialysis.  She reports that she experienced some nausea during and shortly after receiving dialysis, but that this nausea was self-limiting and went away promptly.  Patient reports difficulty sleeping last night for which she requested ambien, states that she takes 10mg ambien at home and was only given 5mg yesterday.  She is also reporting some numbness on her L thumb distal to her AV fistula site, but states that she has had this sensation since the fistula was created.  Patient reports 2 episodes of non bloody diarrhea yesterday.  Patient is urinating her normal volume with no dysuria or hematuria.    MEDICATIONS  (STANDING):  cholecalciferol 2000 Unit(s) Oral daily  FLUoxetine 10 milliGRAM(s) Oral daily  furosemide    Tablet 80 milliGRAM(s) Oral daily  heparin   Injectable 5000 Unit(s) SubCutaneous every 8 hours  influenza   Vaccine 0.5 milliLiter(s) IntraMuscular once  levothyroxine 25 MICROGram(s) Oral daily    MEDICATIONS  (PRN):  acetaminophen   Tablet .. 650 milliGRAM(s) Oral every 6 hours PRN Mild Pain (1 - 3), Moderate Pain (4 - 6)  polyethylene glycol 3350 17 Gram(s) Oral daily PRN Constipation      I&O's Summary    02 Sep 2020 07:01  -  03 Sep 2020 07:00  --------------------------------------------------------  IN: 130 mL / OUT: 0 mL / NET: 130 mL        Vital Signs Last 24 Hrs  T(C): 36.7 (03 Sep 2020 04:11), Max: 37.1 (02 Sep 2020 12:20)  T(F): 98.1 (03 Sep 2020 04:11), Max: 98.7 (02 Sep 2020 12:20)  HR: 78 (03 Sep 2020 04:11) (70 - 78)  BP: 113/75 (03 Sep 2020 04:11) (113/75 - 151/92)  BP(mean): --  RR: 18 (03 Sep 2020 04:11) (18 - 20)  SpO2: 97% (03 Sep 2020 04:11) (94% - 99%)    CAPILLARY BLOOD GLUCOSE          PHYSICAL EXAM:  GENERAL: No acute distress, well-developed  HEAD:  Atraumatic, Normocephalic  CHEST/LUNG: CTAB; No wheezes, rales, or rhonchi  HEART: Regular rate and rhythm; No murmurs, rubs, or gallops  ABDOMEN: Soft, non-tender, non-distended; normal bowel sounds, no organomegaly  EXTREMITIES:  No bleeding from L wrist, but bounding pulse present. 2+ peripheral pulses b/l, No edema b/l  NEUROLOGY: A&O x 3, no focal deficits  SKIN: No rashes or lesions      LABS:                        8.7    7.05  )-----------( 285      ( 02 Sep 2020 07:12 )             27.6     Auto Eosinophil # x     / Auto Eosinophil % x     / Auto Neutrophil # x     / Auto Neutrophil % x     / BANDS % x                            8.2    9.41  )-----------( 291      ( 01 Sep 2020 12:03 )             26.8     Auto Eosinophil # 0.42  / Auto Eosinophil % 4.5   / Auto Neutrophil # 5.94  / Auto Neutrophil % 63.2  / BANDS % x        09-02    142  |  107  |  32<H>  ----------------------------<  73  3.7   |  20<L>  |  4.96<H>  09-01    143  |  114<H>  |  58<H>  ----------------------------<  101<H>  4.3   |  14<L>  |  7.30<H>    Ca    8.8      02 Sep 2020 07:12  Mg     1.9     09-02  Phos  5.3     09-02  TPro  6.8  /  Alb  3.6  /  TBili  0.2  /  DBili  x   /  AST  21  /  ALT  8<L>  /  AlkPhos  107  09-01                  RADIOLOGY & ADDITIONAL TESTS:    Imaging Personally Reviewed:    Consultant(s) Notes Reviewed:      Care Discussed with Consultants/Other Providers:

## 2020-09-03 NOTE — PROGRESS NOTE ADULT - ATTENDING COMMENTS
-Patient seen/examined on 9/3/20. Case/plan discussed with the intern and resident as reviewed/edited by me above and in any comments below.  -Pending outpatient HD finalization. Discussed with CM. Hopeful for DC tomorrow.   -Ok to give patient up to 10mg of Ambien at bedtime. Takes it at home. iSTOP in chart.   -Vascular recs appreciated. Some mild numbness of left thumb near AVF site, likely post-surgical nerve related. Pulses intact. Strong thrill.

## 2020-09-03 NOTE — DIETITIAN INITIAL EVALUATION ADULT. - PHYSICAL APPEARANCE
well nourished/other (specify)/Patient declined nutrition-focused physical exam at this time, wishing to rest. No overt signs of muscle or fat wasting observed. Ht: 59 inches (149.9 cm) (reported) Wt: 139.3 pounds (63.2 kg) (lowest wt 9/3 post-HD)  BMI: 28.1 kg/m2 (based on above wt)  IBW: 98 pounds +/-10% %IBW: 142% (based on above wt)  Skin: no pressure injuries per flowsheets   Edema: no edema per flowsheets

## 2020-09-03 NOTE — PROGRESS NOTE ADULT - PROBLEM SELECTOR PLAN 3
-Created 8/19 by vascular surgery, L radial cephalic  -No pain at site -Created 8/19 by vascular surgery, L radial cephalic  -No pain at site, but having numbness at left thumb. -F/u vascular surgery recs.

## 2020-09-03 NOTE — PROGRESS NOTE ADULT - ASSESSMENT
35F PMHx CKD possibly 2/2 PCKD, hypothyroidism, depression who admitted for hemodialysis for uremia, started on hemodialysis on 9/1/2020.          CKD5 likely 2/2 PCKD, now uremic requiring initiation of hemodialysis  On admission BUN/Cr 58/7.30 with uremia (fatigue, weakness), dyspnea on exertion, decrease UOP.  Initiated on hemodialysis on 9/1/20 via R tunnel hd catheter (placed outpatient).  - plan for hemodialysis 3nd treatment today 9/3, next HD session if remain in hospital will be Saturday 9/6 (start TTS schedule)  -  consult for hemodialysis placement   - continue lasix 80 PO daily to preserve remaining urine function  - monitor BMP, daily weight, strict I/O, avoid nephrotoxic agents (NSAIDs, PPI, contrast) to preserve residual renal function, renally dose medications per HD, renal diet (low potassium/phos)

## 2020-09-03 NOTE — PROVIDER CONTACT NOTE (MEDICATION) - SITUATION
Pt. is requesting ambien to help sleep, RN offered pt melatonin but pt refused and specifically requested ambien since pt takes it at home

## 2020-09-03 NOTE — DIETITIAN INITIAL EVALUATION ADULT. - ETIOLOGY
related to decreased appetite in setting of new hemodialysis, increased nutritional needs related to hemodialysis/renal diet restrictions

## 2020-09-03 NOTE — PROGRESS NOTE ADULT - ASSESSMENT
35y female with a prior medical history of polycystic kidney disease with one atrophic kidney (pt was unsure which kidney is atrophic), hypothyroidism, and depression p/w ARF with elevated BUN, Crt, metabolic acidosis with urgent need for HD.

## 2020-09-03 NOTE — PROGRESS NOTE ADULT - PROBLEM SELECTOR PLAN 4
-Presented with Hb 8.2  -Likely in context of recent menstrual cycle, which ended 8/31. Per pt, she has heavy bleeding  -Monitor with CBC qd  -Iron studies wnl.

## 2020-09-03 NOTE — PROGRESS NOTE ADULT - PROBLEM SELECTOR PLAN 2
Metabolic acidosis in setting of advanced CKD. Serum bicarbonate noted to be 14 on admission (9/1). improving w/ HD  - continue hemodialysis treatment (HD has bicarb bath).  Monitor serum bicarbonate

## 2020-09-04 ENCOUNTER — TRANSCRIPTION ENCOUNTER (OUTPATIENT)
Age: 35
End: 2020-09-04

## 2020-09-04 VITALS
TEMPERATURE: 98 F | DIASTOLIC BLOOD PRESSURE: 76 MMHG | SYSTOLIC BLOOD PRESSURE: 133 MMHG | RESPIRATION RATE: 18 BRPM | OXYGEN SATURATION: 97 % | HEART RATE: 98 BPM

## 2020-09-04 LAB
ANION GAP SERPL CALC-SCNC: 21 MMOL/L — HIGH (ref 5–17)
BUN SERPL-MCNC: 22 MG/DL — SIGNIFICANT CHANGE UP (ref 7–23)
CALCIUM SERPL-MCNC: 9.9 MG/DL — SIGNIFICANT CHANGE UP (ref 8.4–10.5)
CHLORIDE SERPL-SCNC: 95 MMOL/L — LOW (ref 96–108)
CO2 SERPL-SCNC: 22 MMOL/L — SIGNIFICANT CHANGE UP (ref 22–31)
CREAT SERPL-MCNC: 4.92 MG/DL — HIGH (ref 0.5–1.3)
GLUCOSE SERPL-MCNC: 83 MG/DL — SIGNIFICANT CHANGE UP (ref 70–99)
HBV CORE AB SER-ACNC: SIGNIFICANT CHANGE UP
HBV SURFACE AG SER-ACNC: SIGNIFICANT CHANGE UP
HCT VFR BLD CALC: 33.5 % — LOW (ref 34.5–45)
HCV AB S/CO SERPL IA: 0.19 S/CO — SIGNIFICANT CHANGE UP (ref 0–0.99)
HCV AB SERPL-IMP: SIGNIFICANT CHANGE UP
HGB BLD-MCNC: 10.9 G/DL — LOW (ref 11.5–15.5)
MAGNESIUM SERPL-MCNC: 2 MG/DL — SIGNIFICANT CHANGE UP (ref 1.6–2.6)
MCHC RBC-ENTMCNC: 28.2 PG — SIGNIFICANT CHANGE UP (ref 27–34)
MCHC RBC-ENTMCNC: 32.5 GM/DL — SIGNIFICANT CHANGE UP (ref 32–36)
MCV RBC AUTO: 86.6 FL — SIGNIFICANT CHANGE UP (ref 80–100)
NRBC # BLD: 0 /100 WBCS — SIGNIFICANT CHANGE UP (ref 0–0)
PHOSPHATE SERPL-MCNC: 4.5 MG/DL — SIGNIFICANT CHANGE UP (ref 2.5–4.5)
PLATELET # BLD AUTO: 343 K/UL — SIGNIFICANT CHANGE UP (ref 150–400)
POTASSIUM SERPL-MCNC: 3.4 MMOL/L — LOW (ref 3.5–5.3)
POTASSIUM SERPL-SCNC: 3.4 MMOL/L — LOW (ref 3.5–5.3)
RBC # BLD: 3.87 M/UL — SIGNIFICANT CHANGE UP (ref 3.8–5.2)
RBC # FLD: 13.9 % — SIGNIFICANT CHANGE UP (ref 10.3–14.5)
SODIUM SERPL-SCNC: 138 MMOL/L — SIGNIFICANT CHANGE UP (ref 135–145)
WBC # BLD: 9.93 K/UL — SIGNIFICANT CHANGE UP (ref 3.8–10.5)
WBC # FLD AUTO: 9.93 K/UL — SIGNIFICANT CHANGE UP (ref 3.8–10.5)

## 2020-09-04 PROCEDURE — 90686 IIV4 VACC NO PRSV 0.5 ML IM: CPT

## 2020-09-04 PROCEDURE — 99232 SBSQ HOSP IP/OBS MODERATE 35: CPT | Mod: GC

## 2020-09-04 PROCEDURE — 82803 BLOOD GASES ANY COMBINATION: CPT

## 2020-09-04 PROCEDURE — 86706 HEP B SURFACE ANTIBODY: CPT

## 2020-09-04 PROCEDURE — 81025 URINE PREGNANCY TEST: CPT

## 2020-09-04 PROCEDURE — 84132 ASSAY OF SERUM POTASSIUM: CPT

## 2020-09-04 PROCEDURE — 84443 ASSAY THYROID STIM HORMONE: CPT

## 2020-09-04 PROCEDURE — 84100 ASSAY OF PHOSPHORUS: CPT

## 2020-09-04 PROCEDURE — 84295 ASSAY OF SERUM SODIUM: CPT

## 2020-09-04 PROCEDURE — 71046 X-RAY EXAM CHEST 2 VIEWS: CPT

## 2020-09-04 PROCEDURE — 83540 ASSAY OF IRON: CPT

## 2020-09-04 PROCEDURE — 86803 HEPATITIS C AB TEST: CPT

## 2020-09-04 PROCEDURE — 93005 ELECTROCARDIOGRAM TRACING: CPT

## 2020-09-04 PROCEDURE — 84481 FREE ASSAY (FT-3): CPT

## 2020-09-04 PROCEDURE — 99239 HOSP IP/OBS DSCHRG MGMT >30: CPT | Mod: GC

## 2020-09-04 PROCEDURE — 80053 COMPREHEN METABOLIC PANEL: CPT

## 2020-09-04 PROCEDURE — 82310 ASSAY OF CALCIUM: CPT

## 2020-09-04 PROCEDURE — 82435 ASSAY OF BLOOD CHLORIDE: CPT

## 2020-09-04 PROCEDURE — 83550 IRON BINDING TEST: CPT

## 2020-09-04 PROCEDURE — 83970 ASSAY OF PARATHORMONE: CPT

## 2020-09-04 PROCEDURE — 82947 ASSAY GLUCOSE BLOOD QUANT: CPT

## 2020-09-04 PROCEDURE — 93308 TTE F-UP OR LMTD: CPT

## 2020-09-04 PROCEDURE — 85027 COMPLETE CBC AUTOMATED: CPT

## 2020-09-04 PROCEDURE — 87340 HEPATITIS B SURFACE AG IA: CPT

## 2020-09-04 PROCEDURE — 80048 BASIC METABOLIC PNL TOTAL CA: CPT

## 2020-09-04 PROCEDURE — 82330 ASSAY OF CALCIUM: CPT

## 2020-09-04 PROCEDURE — 85014 HEMATOCRIT: CPT

## 2020-09-04 PROCEDURE — 86704 HEP B CORE ANTIBODY TOTAL: CPT

## 2020-09-04 PROCEDURE — 85018 HEMOGLOBIN: CPT

## 2020-09-04 PROCEDURE — 99285 EMERGENCY DEPT VISIT HI MDM: CPT

## 2020-09-04 PROCEDURE — 86480 TB TEST CELL IMMUN MEASURE: CPT

## 2020-09-04 PROCEDURE — 84439 ASSAY OF FREE THYROXINE: CPT

## 2020-09-04 PROCEDURE — 86705 HEP B CORE ANTIBODY IGM: CPT

## 2020-09-04 PROCEDURE — 83735 ASSAY OF MAGNESIUM: CPT

## 2020-09-04 PROCEDURE — 99261: CPT

## 2020-09-04 PROCEDURE — 82306 VITAMIN D 25 HYDROXY: CPT

## 2020-09-04 PROCEDURE — U0003: CPT

## 2020-09-04 PROCEDURE — 83605 ASSAY OF LACTIC ACID: CPT

## 2020-09-04 PROCEDURE — 82728 ASSAY OF FERRITIN: CPT

## 2020-09-04 RX ORDER — CHOLECALCIFEROL (VITAMIN D3) 125 MCG
2000 CAPSULE ORAL
Qty: 30 | Refills: 0
Start: 2020-09-04 | End: 2020-10-03

## 2020-09-04 RX ORDER — FLUOXETINE HCL 10 MG
1 CAPSULE ORAL
Qty: 0 | Refills: 0 | DISCHARGE
Start: 2020-09-04

## 2020-09-04 RX ORDER — ACETAMINOPHEN 500 MG
2 TABLET ORAL
Qty: 240 | Refills: 0
Start: 2020-09-04 | End: 2020-10-03

## 2020-09-04 RX ORDER — ZOLPIDEM TARTRATE 10 MG/1
1 TABLET ORAL
Qty: 0 | Refills: 0 | DISCHARGE

## 2020-09-04 RX ORDER — FUROSEMIDE 40 MG
1 TABLET ORAL
Qty: 30 | Refills: 0
Start: 2020-09-04 | End: 2020-10-03

## 2020-09-04 RX ORDER — ZOLPIDEM TARTRATE 10 MG/1
1 TABLET ORAL
Qty: 30 | Refills: 0
Start: 2020-09-04 | End: 2020-10-03

## 2020-09-04 RX ORDER — LEVOTHYROXINE SODIUM 125 MCG
1 TABLET ORAL
Qty: 0 | Refills: 0 | DISCHARGE

## 2020-09-04 RX ORDER — LEVOTHYROXINE SODIUM 125 MCG
1 TABLET ORAL
Qty: 30 | Refills: 0
Start: 2020-09-04 | End: 2020-10-03

## 2020-09-04 RX ORDER — ZOLPIDEM TARTRATE 10 MG/1
1 TABLET ORAL
Qty: 0 | Refills: 0 | DISCHARGE
Start: 2020-09-04

## 2020-09-04 RX ORDER — CHOLECALCIFEROL (VITAMIN D3) 125 MCG
2000 CAPSULE ORAL
Qty: 0 | Refills: 0 | DISCHARGE
Start: 2020-09-04

## 2020-09-04 RX ORDER — ACETAMINOPHEN 500 MG
2 TABLET ORAL
Qty: 0 | Refills: 0 | DISCHARGE
Start: 2020-09-04

## 2020-09-04 RX ORDER — FLUOXETINE HCL 10 MG
1 CAPSULE ORAL
Qty: 0 | Refills: 0 | DISCHARGE

## 2020-09-04 RX ORDER — FUROSEMIDE 40 MG
1 TABLET ORAL
Qty: 0 | Refills: 0 | DISCHARGE
Start: 2020-09-04

## 2020-09-04 RX ORDER — FLUOXETINE HCL 10 MG
1 CAPSULE ORAL
Qty: 30 | Refills: 0
Start: 2020-09-04 | End: 2020-10-03

## 2020-09-04 RX ORDER — ACETAMINOPHEN 500 MG
0 TABLET ORAL
Qty: 0 | Refills: 0 | DISCHARGE

## 2020-09-04 RX ORDER — SODIUM BICARBONATE 1 MEQ/ML
2 SYRINGE (ML) INTRAVENOUS
Qty: 0 | Refills: 0 | DISCHARGE

## 2020-09-04 RX ORDER — LEVOTHYROXINE SODIUM 125 MCG
1 TABLET ORAL
Qty: 0 | Refills: 0 | DISCHARGE
Start: 2020-09-04

## 2020-09-04 RX ADMIN — Medication 2000 UNIT(S): at 11:47

## 2020-09-04 RX ADMIN — Medication 25 MICROGRAM(S): at 05:56

## 2020-09-04 RX ADMIN — Medication 10 MILLIGRAM(S): at 11:47

## 2020-09-04 RX ADMIN — HEPARIN SODIUM 5000 UNIT(S): 5000 INJECTION INTRAVENOUS; SUBCUTANEOUS at 05:57

## 2020-09-04 RX ADMIN — Medication 80 MILLIGRAM(S): at 05:56

## 2020-09-04 RX ADMIN — INFLUENZA VIRUS VACCINE 0.5 MILLILITER(S): 15; 15; 15; 15 SUSPENSION INTRAMUSCULAR at 11:44

## 2020-09-04 NOTE — PROGRESS NOTE ADULT - PROBLEM SELECTOR PLAN 1
-Sent into hospital by nephrologist Dr. Paul Serrato for urgent HD since pt having insurance issues to get HD set up outpatient and for worsening uremia and acidosis.   -CKD 5 likely sec to ?polycystic kidney disease/one atrophic kidney from unknown cause  -AVF on L, not matured. r chest wall permacath placed by vascular surgery last month on 8/19  -admitting labs: Crt 58, Crt 7.3, pH on VBG 7.24  -Nephro recs appreciated   -Started Lasix 80mg po qd as per Nephro recs   -c/w HD outpatient  -Patient scheduled for outpatient HD MWF  -Hep B and Hep C nonreactive  -f/u quantiferon results  -Vitamin D decreased and PTH elevated. Will continue to monitor.

## 2020-09-04 NOTE — PROGRESS NOTE ADULT - ATTENDING COMMENTS
-Patient seen/examined on 9/4/20. Case/plan discussed with the intern as reviewed/edited by me above and in any comments below. -Patient seen/examined on 9/4/20. Case/plan discussed with the intern as reviewed/edited by me above and in any comments below.  -Patient stable/improved for DC home today. Outpatient renal and renal transplant f/u. -35 minutes spent on the discharge process.

## 2020-09-04 NOTE — PROGRESS NOTE ADULT - PROBLEM/PLAN-5
DISPLAY PLAN FREE TEXT
Calm
DISPLAY PLAN FREE TEXT

## 2020-09-04 NOTE — PROGRESS NOTE ADULT - SUBJECTIVE AND OBJECTIVE BOX
[facility] DIVISION OF KIDNEY DISEASES AND HYPERTENSION   -- FOLLOW UP NOTE --   Alla Garzon  Nephrology Fellow  Pager NS: [phone]   /  Pager LICAPRI: [phone]  (after 5pm or weekend please page the on-call fellow)  --------------------------------------------------------------------------------  24 hour events/subjective:  - yesterday patient had 3rd session of hemodialysis without complication  - overnight no events reported, vitals afebrile no hypotensive episode, voiding (unsaved)  - patient seen and examined at bedside this morning without complaints report feeling great (more energy)  - vitals/lab/medications reviewed    PAST HISTORY  --------------------------------------------------------------------------------  No significant changes to PMH, PSH, FHx, SHx, unless otherwise noted    ALLERGIES & MEDICATIONS  --------------------------------------------------------------------------------  Allergies    No Known Allergies    Intolerances    Standing Inpatient Medications  cholecalciferol 2000 Unit(s) Oral daily  FLUoxetine 10 milliGRAM(s) Oral daily  furosemide    Tablet 80 milliGRAM(s) Oral daily  heparin   Injectable 5000 Unit(s) SubCutaneous every 8 hours  influenza   Vaccine 0.5 milliLiter(s) IntraMuscular once  levothyroxine 25 MICROGram(s) Oral daily    PRN Inpatient Medications  acetaminophen   Tablet .. 650 milliGRAM(s) Oral every 6 hours PRN  polyethylene glycol 3350 17 Gram(s) Oral daily PRN  zolpidem 5 milliGRAM(s) Oral at bedtime PRN  zolpidem 5 milliGRAM(s) Oral at bedtime PRN    REVIEW OF SYSTEMS  --------------------------------------------------------------------------------  Gen: no fever, chills, weakness  Respiratory: No dyspnea, cough  CV: No chest pain, orthopnea  GI: No abdominal pain, nausea, vomiting, diarrhea  MSK: no edema  Neuro: No dizziness, lightheadedness  Heme: No bleeding  All other systems were reviewed and are negative, except as noted.    VITALS/PHYSICAL EXAM  --------------------------------------------------------------------------------  T(C): 37 (09-04-20 @ 05:45), Max: 37.1 (09-03-20 @ 21:18)  HR: 100 (09-04-20 @ 05:45) (74 - 100)  BP: 113/70 (09-04-20 @ 05:45) (101/73 - 120/65)  RR: 18 (09-04-20 @ 05:45) (18 - 20)  SpO2: 100% (09-04-20 @ 05:45) (98% - 100%)  Wt(kg): --    09-03-20 @ 07:01  -  09-04-20 @ 07:00  --------------------------------------------------------  IN: 100 mL / OUT: 0 mL / NET: 100 mL    Physical Exam:              Gen: NAD, well-appearing on room air  	HEENT: moist mucous membrane  	Pulm: CTA B/L, no crackles  	CV: RRR, S1S2; no rub/murmur  	GI: +BS, soft, nontender/nondistended  	: No suprapubic tenderness  	MSK: Warm, no edema, LUE AVF               Neuro: AAOx3  	Psych: Normal affect and mood  	Skin: Warm  	Vascular access:  R tunnel hd catheter    LABS/STUDIES  --------------------------------------------------------------------------------              10.1   9.33  >-----------<  331      [09-03-20 @ 10:05]              31.2     143  |  102  |  26  ----------------------------<  68      [09-03-20 @ 10:05]  3.6   |  21  |  4.64        Ca     9.3     [09-03-20 @ 10:05]      Mg     2.0     [09-03-20 @ 10:05]      Phos  4.4     [09-03-20 @ 10:05]  Creatinine Trend:  SCr 4.64 [09-03 @ 10:05]  SCr 4.96 [09-02 @ 07:12]  SCr 7.30 [09-01 @ 12:03]        Iron 73, TIBC 237, %sat 31      [09-02-20 @ 10:14]  Ferritin 60      [09-02-20 @ 10:20]  PTH -- (Ca 8.4)      [09-02-20 @ 10:20]   1011  Vitamin D (25OH) 16.4      [09-02-20 @ 10:20]  TSH 11.10      [09-02-20 @ 10:20]    HBsAb 572.4      [09-03-20 @ 17:45]  HBsAg Nonreact      [09-04-20 @ 04:09]  HBcAb Nonreact      [09-04-20 @ 04:09]  HCV 0.19, Nonreact      [09-04-20 @ 04:09]

## 2020-09-04 NOTE — PROGRESS NOTE ADULT - PROBLEM SELECTOR PLAN 6
Reason for Call:  Other appointment question    Detailed comments: The first time he can get into [facility] 7/20   with the referral   He wants to know if this is ok because it is a month away    Phone Number Patient can be reached at: Home number on file [phone] (home)    Best Time: anytime    Can we leave a detailed message on this number? YES    Call taken on 6/14/2017 at 2:20 PM by Michelle Edwards       TSH 11.1   -c/w Levothyroxine  -Will need endo f/u outpatient   -Continue to monitor for signs of hypothyroidism  -F/u T3 and T4. TSH 11.1   -c/w Levothyroxine  -Patient educated on proper usage of Synthroid, as she had been previously taking it with meals.

## 2020-09-04 NOTE — PROGRESS NOTE ADULT - SUBJECTIVE AND OBJECTIVE BOX
PROGRESS NOTE:   Authored by Dr. Kiko Stanley MD  Pager [phone] [facility], [id number] [address],    Patient is a 35y old  Female who presents with a chief complaint of Needs dialysis (04 Sep 2020 09:22)      SUBJECTIVE / OVERNIGHT EVENTS:    ADDITIONAL REVIEW OF SYSTEMS:    MEDICATIONS  (STANDING):  cholecalciferol 2000 Unit(s) Oral daily  FLUoxetine 10 milliGRAM(s) Oral daily  furosemide    Tablet 80 milliGRAM(s) Oral daily  heparin   Injectable 5000 Unit(s) SubCutaneous every 8 hours  levothyroxine 25 MICROGram(s) Oral daily    MEDICATIONS  (PRN):  acetaminophen   Tablet .. 650 milliGRAM(s) Oral every 6 hours PRN Mild Pain (1 - 3), Moderate Pain (4 - 6)  polyethylene glycol 3350 17 Gram(s) Oral daily PRN Constipation  zolpidem 5 milliGRAM(s) Oral at bedtime PRN Insomnia  zolpidem 5 milliGRAM(s) Oral at bedtime PRN Insomnia      CAPILLARY BLOOD GLUCOSE        I&O's Summary    03 Sep 2020 07:01  -  04 Sep 2020 07:00  --------------------------------------------------------  IN: 100 mL / OUT: 0 mL / NET: 100 mL    04 Sep 2020 07:01  -  04 Sep 2020 12:39  --------------------------------------------------------  IN: 240 mL / OUT: 0 mL / NET: 240 mL        PHYSICAL EXAM:  Vital Signs Last 24 Hrs  T(C): 37 (04 Sep 2020 05:45), Max: 37.1 (03 Sep 2020 21:18)  T(F): 98.6 (04 Sep 2020 05:45), Max: 98.8 (03 Sep 2020 21:18)  HR: 100 (04 Sep 2020 05:45) (95 - 100)  BP: 113/70 (04 Sep 2020 05:45) (101/73 - 113/70)  BP(mean): --  RR: 18 (04 Sep 2020 05:45) (18 - 20)  SpO2: 100% (04 Sep 2020 05:45) (98% - 100%)    GENERAL: No acute distress, well-developed  HEAD:  Atraumatic, Normocephalic  EYES: EOMI, PERRLA, conjunctiva and sclera clear  NECK: Supple, no lymphadenopathy, no JVD  CHEST/LUNG: CTAB; No wheezes, rales, or rhonchi  HEART: Regular rate and rhythm; No murmurs, rubs, or gallops  ABDOMEN: Soft, non-tender, non-distended; normal bowel sounds, no organomegaly  EXTREMITIES:  2+ peripheral pulses b/l, No clubbing, cyanosis, or edema  NEUROLOGY: A&O x 3, no focal deficits  SKIN: No rashes or lesions    LABS:                        10.9   9.93  )-----------( 343      ( 04 Sep 2020 10:33 )             33.5     09-04    138  |  95<L>  |  22  ----------------------------<  83  3.4<L>   |  22  |  4.92<H>    Ca    9.9      04 Sep 2020 10:33  Phos  4.5     09-04  Mg     2.0     09-04                  RADIOLOGY & ADDITIONAL TESTS:  Results Reviewed:   Imaging Personally Reviewed:  Electrocardiogram Personally Reviewed:    COORDINATION OF CARE:  Care Discussed with Consultants/Other Providers [Y/N]:  Prior or Outpatient Records Reviewed [Y/N]: PROGRESS NOTE:   Authored by Dr. Kiko Stanley MD  Pager [phone] [facility], [id number] [address],    Patient is a 35y old  Female who presents with a chief complaint of Needs dialysis (04 Sep 2020 09:22)      SUBJECTIVE / OVERNIGHT EVENTS: No events overnight. Patient seen and examined at bedside. Patient said numbness and tingling of left hadn had improved. Patient has no acute complaints and desires to go home.     ADDITIONAL REVIEW OF SYSTEMS: Denies any fever, headache, chest pain, fatigue, SOB, N/V/C/D    MEDICATIONS  (STANDING):  cholecalciferol 2000 Unit(s) Oral daily  FLUoxetine 10 milliGRAM(s) Oral daily  furosemide    Tablet 80 milliGRAM(s) Oral daily  heparin   Injectable 5000 Unit(s) SubCutaneous every 8 hours  levothyroxine 25 MICROGram(s) Oral daily    MEDICATIONS  (PRN):  acetaminophen   Tablet .. 650 milliGRAM(s) Oral every 6 hours PRN Mild Pain (1 - 3), Moderate Pain (4 - 6)  polyethylene glycol 3350 17 Gram(s) Oral daily PRN Constipation  zolpidem 5 milliGRAM(s) Oral at bedtime PRN Insomnia  zolpidem 5 milliGRAM(s) Oral at bedtime PRN Insomnia      CAPILLARY BLOOD GLUCOSE        I&O's Summary    03 Sep 2020 07:01  -  04 Sep 2020 07:00  --------------------------------------------------------  IN: 100 mL / OUT: 0 mL / NET: 100 mL    04 Sep 2020 07:01  -  04 Sep 2020 12:39  --------------------------------------------------------  IN: 240 mL / OUT: 0 mL / NET: 240 mL        PHYSICAL EXAM:  Vital Signs Last 24 Hrs  T(C): 37 (04 Sep 2020 05:45), Max: 37.1 (03 Sep 2020 21:18)  T(F): 98.6 (04 Sep 2020 05:45), Max: 98.8 (03 Sep 2020 21:18)  HR: 100 (04 Sep 2020 05:45) (95 - 100)  BP: 113/70 (04 Sep 2020 05:45) (101/73 - 113/70)  BP(mean): --  RR: 18 (04 Sep 2020 05:45) (18 - 20)  SpO2: 100% (04 Sep 2020 05:45) (98% - 100%)    GENERAL: No acute distress, well-developed  HEAD:  Atraumatic, Normocephalic  CHEST/LUNG: CTAB; No wheezes, rales, or rhonchi  HEART: Regular rate and rhythm; No murmurs, rubs, or gallops  ABDOMEN: Soft, non-tender, non-distended; normal bowel sounds, no organomegaly  EXTREMITIES: Palpable thrill of left radial pulse. Slight numbness of left thenar eminence. No edema b/l.   NEUROLOGY: A&O x 3, no focal deficits  SKIN: No rashes or lesions    LABS:                        10.9   9.93  )-----------( 343      ( 04 Sep 2020 10:33 )             33.5     09-04    138  |  95<L>  |  22  ----------------------------<  83  3.4<L>   |  22  |  4.92<H>    Ca    9.9      04 Sep 2020 10:33  Phos  4.5     09-04  Mg     2.0     09-04                  RADIOLOGY & ADDITIONAL TESTS:  Results Reviewed:   Imaging Personally Reviewed:  Electrocardiogram Personally Reviewed:    COORDINATION OF CARE:  Care Discussed with Consultants/Other Providers [Y/N]:  Prior or Outpatient Records Reviewed [Y/N]:

## 2020-09-04 NOTE — DISCHARGE NOTE NURSING/CASE MANAGEMENT/SOCIAL WORK - PATIENT PORTAL LINK FT
You can access the FollowMyHealth Patient Portal offered by [facility] by registering at the following website: [email or web address]. By joining [employer]’s FollowMyHealth portal, you will also be able to view your health information using other applications (apps) compatible with our system.

## 2020-09-04 NOTE — PROGRESS NOTE ADULT - PROBLEM SELECTOR PLAN 3
Renal bone disease 2/2 advanced CKD, serum phos4.4  - Low phosphorus diet advised. Monitor serum phosphorus

## 2020-09-04 NOTE — PROGRESS NOTE ADULT - PROVIDER SPECIALTY LIST ADULT
Internal Medicine
Nephrology
Internal Medicine

## 2020-09-04 NOTE — PROGRESS NOTE ADULT - ATTENDING COMMENTS
Feeling greatly improved  1.  ESRD--reviewed outpt disposition INCLUDING f/u with Dr. Carmichael in transplant clinic  2.  HyperPO4--no binders at present.  Diet maintenance currently

## 2020-09-04 NOTE — PROGRESS NOTE ADULT - PROBLEM SELECTOR PLAN 6
-TSH 11.1   -c/w Levothyroxine; patient had been taking medication with breakfast - counseled patient on appropriate way of taking levothyroxine (first thing in am, on an empty stomach)  -Will need endo f/u outpatient   -Continue to monitor for signs of hypothyroidism  -F/u T3 and T4.

## 2020-09-04 NOTE — PROGRESS NOTE ADULT - ASSESSMENT
35y female with a prior medical history of polycystic kidney disease with one atrophic kidney (pt was unsure which kidney is atrophic), hypothyroidism, and depression p/w ARF with elevated BUN, Crt, metabolic acidosis with urgent need for HD. 35y female with a prior medical history of polycystic kidney disease with one atrophic kidney (pt was unsure which kidney is atrophic), hypothyroidism, and depression p/w ARF with elevated BUN, Crt, metabolic acidosis with urgent need for HD. Patient now stable and ready for d/c with outpatient HD

## 2020-09-04 NOTE — PROGRESS NOTE ADULT - PROBLEM SELECTOR PLAN 7
-Diet: renal restricted  -DVT ppx: hep subq
-Diet: renal restricted  -DVT ppx: hep subq
-Diet: renal restricted  -DVT ppx: hep subq.
-f/u with case management re: outpatient dialysis coordination
-Diet: renal restricted  -DVT ppx: hep subq.
-Diet: renal restricted  -DVT ppx: hep subq

## 2020-09-04 NOTE — PROGRESS NOTE ADULT - ASSESSMENT
35F PMHx CKD possibly 2/2 PCKD, hypothyroidism, depression who admitted for hemodialysis for uremia, started on hemodialysis on 9/1/2020.          CKD5 likely 2/2 PCKD, now uremic requiring initiation of hemodialysis  On admission BUN/Cr 58/7.30 with uremia (fatigue, weakness), dyspnea on exertion, decrease UOP.  Initiated on hemodialysis on 9/1/20 via R tunnel hd catheter (placed outpatient). s/p HD on 9/3 (3rd treatment)  - no plan for HD today, pt accepted to [facility] schedule per notes  - please discharge on lasix 80 PO daily to preserve remaining urine function  - monitor BMP, daily weight, strict I/O, avoid nephrotoxic agents (NSAIDs, PPI, contrast) to preserve residual renal function, renally dose medications per HD, renal diet (low potassium/phos)    Spoke with patient about renal transplant and Dr. Carmichael from renal transplant also give patient information.  She said she will call transplant center after discharge.

## 2020-09-04 NOTE — DISCHARGE NOTE NURSING/CASE MANAGEMENT/SOCIAL WORK - NSDCVIVACCINE_GEN_ALL_CORE_FT
patient with fever as per family, motrin given No Vaccines Administered. Influenza , 2020/9/4 11:44 , Tenisha Morfin (RN)

## 2020-09-04 NOTE — PROGRESS NOTE ADULT - PROBLEM SELECTOR PROBLEM 1
CKD (chronic kidney disease), stage V
Polycystic kidney disease

## 2020-09-04 NOTE — PROGRESS NOTE ADULT - PROBLEM SELECTOR PLAN 1
-Sent into hospital by nephrologist Dr. Paul Serrato for urgent HD since pt having insurance issues to get HD set up outpatient and for worsening uremia and acidosis.   -CKD 5 likely sec to ?polycystic kidney disease/one atrophic kidney from unknown cause  -AVF on L, not yet matured. r chest wall permacath placed by vascular surgery last month on 8/19  -admitting labs: Crt 58, Crt 7.3, pH on VBG 7.24  -Nephro recs appreciated   -Started Lasix 80mg po qd as per Nephro recs   -c/w HD per renal   -F/u w/ SW regarding outpatient HD  -F/u Hep B, Hep C, and quantiferon results  -Vitamin D decreased and PTH elevated. Will continue to monitor -Sent into hospital by nephrologist Dr. Paul Serrato for urgent HD since pt having insurance issues to get HD set up outpatient and for worsening uremia and acidosis.   -CKD 5 likely sec to ?polycystic kidney disease/one atrophic kidney from unknown cause  -admitting labs: Crt 58, Crt 7.3, pH on VBG 7.24  -Nephro recs appreciated   -Started Lasix 80mg po qd as per Nephro recs   -Patient can now be d/c with HD outpatient 3 days a week.

## 2020-09-04 NOTE — PROGRESS NOTE ADULT - REASON FOR ADMISSION
Needs dialysis

## 2020-09-04 NOTE — PROGRESS NOTE ADULT - PROBLEM SELECTOR PLAN 4
-Presented with Hb 8.2  -Likely in context of recent menstrual cycle, which ended 8/31. Per pt, she has heavy bleeding  -Monitor with CBC qd  -Iron studies wnl.  -H&H now improving

## 2020-09-04 NOTE — PROGRESS NOTE ADULT - PROBLEM SELECTOR PLAN 3
-Created 8/19 by vascular surgery, L radial cephalic  -No pain at site, but having numbness at left thumb. -F/u vascular surgery recs. -Created 8/19 by vascular surgery, L radial cephalic  -No pain at site, but having numbness at left thumb. Today symptoms are improved.   -Vascular surgery says no intervention needed at this time.

## 2020-09-04 NOTE — PROGRESS NOTE ADULT - PROBLEM SELECTOR PLAN 3
-Created 8/19 by vascular surgery, L radial cephalic  -Pt reports numbness at L thumb distal to AVF site, now improving  -vascular signed off stating AVF looks fine, rec patient f/u with vascular surgeon outpatient

## 2020-09-04 NOTE — PROGRESS NOTE ADULT - SUBJECTIVE AND OBJECTIVE BOX
Authored by Annamarie Mena, MS3    MUQSIT, ATIQA  35y  Female      Patient is a 35y old  Female who presents with a chief complaint of Needs dialysis (04 Sep 2020 12:39)      SUBJECTIVE/OVERNIGHT EVENTS: Patient seen and examined by resident.  She reports that she feels well and has increased energy after dialysis.  Patient states that she requested ambien to help her sleep last night and was able to fall asleep after receiving 5mg ambien.  Patient reports 2 episodes of diarrhea yesterday evening.  She also reports 1 episode of nausea this am that resolved after deep breathing.  Patient states that she would prefer not to take any medication for her nausea.  Patient states that the numbness and tingling in her L thumb is resolving and feels better today.        REVIEW OF SYSTEMS:  CONSTITUTIONAL: No fever or fatigue  RESPIRATORY: No cough, No shortness of breath  CARDIOVASCULAR: No chest pain, palpitations, or dizziness  GASTROINTESTINAL: No abdominal or epigastric pain.   GENITOURINARY: No dysuria, frequency, hematuria, or incontinence    FAMILY HISTORY:  Family history of polycystic kidney    T(C): 37 (09-04-20 @ 05:45), Max: 37.1 (09-03-20 @ 21:18)  HR: 100 (09-04-20 @ 05:45) (95 - 100)  BP: 113/70 (09-04-20 @ 05:45) (101/73 - 113/70)  RR: 18 (09-04-20 @ 05:45) (18 - 20)  SpO2: 100% (09-04-20 @ 05:45) (98% - 100%)  Wt(kg): --Vital Signs Last 24 Hrs  T(C): 37 (04 Sep 2020 05:45), Max: 37.1 (03 Sep 2020 21:18)  T(F): 98.6 (04 Sep 2020 05:45), Max: 98.8 (03 Sep 2020 21:18)  HR: 100 (04 Sep 2020 05:45) (95 - 100)  BP: 113/70 (04 Sep 2020 05:45) (101/73 - 113/70)  BP(mean): --  RR: 18 (04 Sep 2020 05:45) (18 - 20)  SpO2: 100% (04 Sep 2020 05:45) (98% - 100%)  No Known Allergies      PHYSICAL EXAM:  GENERAL: NAD, sitting comfortably in chair  HEENT: MMM  NERVOUS SYSTEM:  Alert & Oriented X3, Good concentration  CHEST/LUNG: Clear to percussion bilaterally; No rales, rhonchi, wheezing, or rubs  HEART: Regular rate and rhythm; No murmurs, rubs, or gallops  ABDOMEN: Soft, Nontender, Nondistended  EXTREMITIES:  2+ Peripheral Pulses, mild lower extremity edema  LYMPH: No lymphadenopathy noted  SKIN: No rashes or lesions    Consultant(s) Notes Reviewed:  [x ] YES  [ ] NO  Care Discussed with Consultants/Other Providers [ x] YES  [ ] NO    LABS:                          10.9   9.93  )-----------( 343      ( 04 Sep 2020 10:33 )             33.5       09-04    138  |  95<L>  |  22  ----------------------------<  83  3.4<L>   |  22  |  4.92<H>    Ca    9.9      04 Sep 2020 10:33  Phos  4.5     09-04  Mg     2.0     09-04          RADIOLOGY & ADDITIONAL TESTS:    Imaging Personally Reviewed:  [ ] YES  [ ] NO  acetaminophen   Tablet .. 650 milliGRAM(s) Oral every 6 hours PRN  cholecalciferol 2000 Unit(s) Oral daily  FLUoxetine 10 milliGRAM(s) Oral daily  furosemide    Tablet 80 milliGRAM(s) Oral daily  heparin   Injectable 5000 Unit(s) SubCutaneous every 8 hours  levothyroxine 25 MICROGram(s) Oral daily  polyethylene glycol 3350 17 Gram(s) Oral daily PRN  zolpidem 5 milliGRAM(s) Oral at bedtime PRN  zolpidem 5 milliGRAM(s) Oral at bedtime PRN      HEALTH ISSUES - PROBLEM Dx:  Renal bone disease: Renal bone disease  Metabolic acidosis: Metabolic acidosis  Hypertension: Hypertension  CKD (chronic kidney disease), stage V: CKD (chronic kidney disease), stage V  Discharge planning issues: Discharge planning issues  Prophylactic measure: Prophylactic measure  Hypothyroidism, unspecified type: Hypothyroidism, unspecified type  Obsessive-compulsive disorder, unspecified type: Obsessive-compulsive disorder, unspecified type  Anemia due to blood loss: Anemia due to blood loss  AVF (arteriovenous fistula): AVF (arteriovenous fistula)  Polycystic kidney disease: Polycystic kidney disease

## 2020-09-08 RX ORDER — ZOLPIDEM TARTRATE 10 MG/1
10 TABLET, FILM COATED ORAL
Refills: 0 | Status: DISCONTINUED | COMMUNITY
End: 2020-09-08

## 2020-09-08 RX ORDER — ACETAMINOPHEN 325 MG/1
325 TABLET, FILM COATED ORAL EVERY 6 HOURS
Refills: 0 | Status: ACTIVE | COMMUNITY

## 2020-09-11 DIAGNOSIS — E87.6 HYPOKALEMIA: ICD-10-CM

## 2020-09-15 ENCOUNTER — APPOINTMENT (OUTPATIENT)
Dept: VASCULAR SURGERY | Facility: CLINIC | Age: 35
End: 2020-09-15

## 2020-09-22 ENCOUNTER — APPOINTMENT (OUTPATIENT)
Dept: VASCULAR SURGERY | Facility: CLINIC | Age: 35
End: 2020-09-22
Payer: COMMERCIAL

## 2020-09-22 ENCOUNTER — APPOINTMENT (OUTPATIENT)
Dept: VASCULAR SURGERY | Facility: CLINIC | Age: 35
End: 2020-09-22
Payer: MEDICAID

## 2020-09-22 VITALS
BODY MASS INDEX: 29.64 KG/M2 | HEART RATE: 79 BPM | HEIGHT: 59 IN | DIASTOLIC BLOOD PRESSURE: 67 MMHG | SYSTOLIC BLOOD PRESSURE: 107 MMHG | WEIGHT: 147 LBS

## 2020-09-22 VITALS — TEMPERATURE: 97.1 F

## 2020-09-22 PROCEDURE — 99024 POSTOP FOLLOW-UP VISIT: CPT

## 2020-09-22 PROCEDURE — 93922 UPR/L XTREMITY ART 2 LEVELS: CPT

## 2020-09-22 NOTE — DISCUSSION/SUMMARY
[FreeTextEntry1] : pvr shows no evidence of significant steal syndrome \par pt to follow up in 2 weeks with repeat duplex of avf \par \par pt advised to keep the stick covered and at next visit will attempt to remove again\par pt advised to follow up sooner if the stich becomes more bothersome

## 2020-09-22 NOTE — PHYSICAL EXAM
[de-identified] : appears well  [FreeTextEntry1] : palpable thrill over the avf,  strength 5/5 \par palpable radial and ulnar pulse \par right ij permcath small suture tail along the incision site, tender when pulled unable to visualize the knot

## 2020-09-22 NOTE — REASON FOR VISIT
[de-identified] : left upper extremity radial cephalic avf [de-identified] : 8/19/20 [de-identified] : 36 yo female with a history of esrd recently started on hd via right ij permcath s/p left upper extremity radial cephalic avf presents for follow up.  pt states that she noted a new stich near the incision site of the permcath.  \par pt denies any fevers or chills\par pt states that the hand pain has now improved

## 2020-09-23 ENCOUNTER — APPOINTMENT (OUTPATIENT)
Dept: VASCULAR SURGERY | Facility: CLINIC | Age: 35
End: 2020-09-23

## 2020-10-06 ENCOUNTER — APPOINTMENT (OUTPATIENT)
Dept: NEPHROLOGY | Facility: CLINIC | Age: 35
End: 2020-10-06
Payer: COMMERCIAL

## 2020-10-06 ENCOUNTER — APPOINTMENT (OUTPATIENT)
Dept: VASCULAR SURGERY | Facility: CLINIC | Age: 35
End: 2020-10-06
Payer: COMMERCIAL

## 2020-10-06 ENCOUNTER — APPOINTMENT (OUTPATIENT)
Dept: TRANSPLANT | Facility: CLINIC | Age: 35
End: 2020-10-06
Payer: COMMERCIAL

## 2020-10-06 VITALS
BODY MASS INDEX: 29.08 KG/M2 | HEART RATE: 100 BPM | TEMPERATURE: 97.9 F | DIASTOLIC BLOOD PRESSURE: 77 MMHG | RESPIRATION RATE: 15 BRPM | SYSTOLIC BLOOD PRESSURE: 112 MMHG | WEIGHT: 144 LBS

## 2020-10-06 VITALS — TEMPERATURE: 96.9 F

## 2020-10-06 VITALS
SYSTOLIC BLOOD PRESSURE: 112 MMHG | RESPIRATION RATE: 15 BRPM | TEMPERATURE: 97.9 F | DIASTOLIC BLOOD PRESSURE: 77 MMHG | HEART RATE: 100 BPM | BODY MASS INDEX: 29.03 KG/M2 | WEIGHT: 144 LBS | OXYGEN SATURATION: 99 % | HEIGHT: 59 IN

## 2020-10-06 PROCEDURE — 93990 DOPPLER FLOW TESTING: CPT

## 2020-10-06 PROCEDURE — 99204 OFFICE O/P NEW MOD 45 MIN: CPT

## 2020-10-06 PROCEDURE — 99024 POSTOP FOLLOW-UP VISIT: CPT

## 2020-10-06 PROCEDURE — 99205 OFFICE O/P NEW HI 60 MIN: CPT

## 2020-10-06 RX ORDER — SODIUM BICARBONATE 650 MG/1
650 TABLET ORAL TWICE DAILY
Qty: 120 | Refills: 3 | Status: DISCONTINUED | COMMUNITY
Start: 2020-07-24 | End: 2020-10-06

## 2020-10-06 RX ORDER — POTASSIUM CHLORIDE 1.5 G/1.58G
20 POWDER, FOR SOLUTION ORAL TWICE DAILY
Qty: 20 | Refills: 0 | Status: DISCONTINUED | COMMUNITY
Start: 2020-09-11 | End: 2020-10-06

## 2020-10-06 RX ORDER — OXYCODONE AND ACETAMINOPHEN 5; 325 MG/1; MG/1
5-325 TABLET ORAL
Qty: 18 | Refills: 0 | Status: DISCONTINUED | COMMUNITY
Start: 2020-08-20 | End: 2020-10-06

## 2020-10-06 NOTE — PHYSICAL EXAM
[Well Developed] : well developed [Well Nourished] : well nourished [No Acute Distress] : no acute distress [Normocephalic] : normocephalic [Atraumatic] : atraumatic [Sclera Anicteric] : sclera anicteric [Neck Supple] : neck supple [Clear to Auscultation] : clear to auscultation [Breathing Comfortably on RA] : breathing comfortably on room air [Normal Rate] : normal rate [Regular Rhythm] : regular rhythm [Murmur] : murmur [Systolic Murmur] : systolic murmur [Soft] : soft [Non-tender] : non-tender [In Left Forearm] : fistula/graft in left forearm [] : right dorsalis pedis palpable [Alert] : alert [Responds to Questions Appropriately] : responds to questions appropriately [Oriented] : oriented [Appropriate] : appropriate [FreeTextEntry1] : No carotid bruits [TextBox_34] : No ulcers or edema [TextBox_86] : No adenopathy

## 2020-10-06 NOTE — CONSULT LETTER
[Dear  ___] : Dear  [unfilled], [Consult Letter:] : I had the pleasure of evaluating your patient, [unfilled]. [Please see my note below.] : Please see my note below. [Consult Closing:] : Thank you very much for allowing me to participate in the care of this patient.  If you have any questions, please do not hesitate to contact me. [Sincerely,] : Sincerely, [FreeTextEntry2] : Lulú Serrato MD [FreeTextEntry1] : P [FreeTextEntry3] : Dio

## 2020-10-06 NOTE — ASSESSMENT
[FreeTextEntry1] : .Ms. ALBA 35 year She is evaluated for kidney transplantation.\par Pre transplant/ESRD: Patient will benefit from renal allotransplantation she is an acceptable risk candidate, diabetes, \par Will get Renasight.\par Medical risks: Cardiovascular, cancer screening.\par Hypothyroidism: Continue follow up with primary MD\par Cardiac risk:  will get further evaluation; echo, stress test; Reviewed cardiovascular risk reduction strategies\par Cancer screening: PAP No known h/o neoplastic disease\par ID: Serology for acute and chronic viral infections. Screening for latent TB.\par Imaging: Renal/abdominal /chest  imaging\par Consults: Nutrition, social work, cardiology, Psychiatry, Transplant surgery.\par Reviewed factors affecting survival and morbidity while on wait list and reviewed carlin-operative and long-term risk factors affecting outcome in kidney transplantation.\par Details of transplant surgery, immunosuppression and its complications and benefits of live donor transplantation as well as variability in wait times across regions and multiple listing were discussed. KDPI >85% and PHS high risk criteria donors were discussed. Discussed factors affecting morbidity and mortality while on hemodialysis.\par Patient has potential live donor (friend, boyfriend, brother ) at present. \par Will proceed with completing  work up and listing for transplant/ live donor transplant once work up is reviewed and found to be ok.\par
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
## 2020-10-06 NOTE — REASON FOR VISIT
[Initial] : an initial visit for [Kidney Transplant Evaluation] : kidney transplant evaluation [Other: _____] : [unfilled] [FreeTextEntry2] : Dr. Lulú Serrato

## 2020-10-06 NOTE — HISTORY OF PRESENT ILLNESS
[] : left radiocephalic fistula [FreeTextEntry1] : 36 yo female with history of esrd on hd via permcath presents for follow up of left upper extremity radial cephalic avf\par pt without any complaints at this time

## 2020-10-06 NOTE — PHYSICAL EXAM
[General Appearance - Alert] : alert [General Appearance - In No Acute Distress] : in no acute distress [Extraocular Movements] : extraocular movements were intact [Outer Ear] : the ears and nose were normal in appearance [Auscultation Breath Sounds / Voice Sounds] : lungs were clear to auscultation bilaterally [Heart Sounds Gallop] : no gallops [Jugular Venous Distention Increased] : there was no jugular-venous distention [Full Pulse] : the pedal pulses are present [Edema] : there was no peripheral edema [Bowel Sounds] : normal bowel sounds [Abdomen Soft] : soft [Abdomen Tenderness] : non-tender [Supraclavicular Lymph Nodes Enlarged Bilaterally] : supraclavicular [Axillary Lymph Nodes Enlarged Bilaterally] : axillary [Involuntary Movements] : no involuntary movements were seen [___ (cm) Fistula] : [unfilled] (cm) fistula [Dialysis Catheter] : dialysis catheter [Bruit] : a bruit was present [FreeTextEntry1] : right tunneled dialysis catheter [] : no rash [No Focal Deficits] : no focal deficits [Oriented To Time, Place, And Person] : oriented to person, place, and time

## 2020-10-06 NOTE — DISCUSSION/SUMMARY
[FreeTextEntry1] : 34 yo female with history of esrd on hd via permcath presents for follow up of left upper extremity radial cephalic avf \par duplex shows patnet fistula with 50-75% stenosis of the juxta anastomosis, avf is still small in size with a flow rate of 614 \par will plan for maturation of the left upper extremity avf

## 2020-10-06 NOTE — REVIEW OF SYSTEMS
[Wears glasses] : wears glasses [Can Walk (___ Blocks)] : can walk [unfilled] blocks [Regular Menstrual Periods] : regular menstrual periods [Urine Output: ____] : Urine Output: [unfilled] [Tattoos] : tattoos [Itching] : itching [Anxiety] : anxiety [Anemia] : anemia [Easy Bruising] : easy bruising [Fever] : no fever [Chills] : no chills [Fatigue] : no fatigue [Night Sweats] : no night sweats [Recent Weight Gain (___ Lbs)] : no recent weight gain [Recent Weight Loss (___ Lbs)] : no recent weight loss [Sore throat] : no sore throat [Pain/Stiffness] : no pain/stiffness [Rhinorrhea] : no rhinorrhea [Trauma] : no trauma [Sclera anicteric] : sclera icteric [Double vision] : no double vision [Blurred Vision] : no blurred vision [Eye Pain] : no eye pain [Chest Pain] : no chest pain [Palpitations] : no palpitations [SOB] : no shortness of breath [Wheezing] : no wheezing [Cough] : no cough [Dyspnea on Exertion] : no dyspnea on exertion [Pleuritic Chest Pain] : no pleuritic chest pain [Abdominal Pain] : no abdominal pain [Nausea] : no nausea [Constipation] : no constipation [Diarrhea] : diarrhea [Vomiting] : no vomiting [Dysuria] : no dysuria [Frequency] : no frequency [Urgency] : no urinary urgency [Hematuria] : no hematuria [UTI] : no UTI [Joint Pain] : no joint pain [Joint Stiffness] : no joint stiffness [Muscle Pain] : no muscle pain [Muscle Weakness] : no muscle weakness [Skin Rash] : no skin rash [Hair Changes] : no hair changes [Headache] : no headache [Dizziness] : no dizziness [Fainting] : no fainting [Confusion] : no confusion [Memory Loss] : no memory loss [Unsteady Gait] : steady gait [Seizures] : no seizures [Suicidal] : not suicidal [Hallucinations] : no hallucinations [Depression] : no depression [Adenopathy] : no adenopathy [Easy Bleeding] : no easy bleeding
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
## 2020-10-06 NOTE — PHYSICAL EXAM
[Thrill] : thrill [Hand well perfused] : hand well perfused [Normal] : coordination grossly intact, no focal deficits [Redness surrounding] : no redness surrounding [Drainage] : no drainage [Pulsatile Thrill] : no pulsatile thrill [Aneurysm] : no aneurysm [Bleeding] : no bleeding [Ulcer] : no ulcer

## 2020-10-19 LAB
ABO + RH PNL BLD: NORMAL
ABO + RH PNL BLD: NORMAL
ALBUMIN SERPL ELPH-MCNC: 4.7 G/DL
ALP BLD-CCNC: 151 U/L
ALT SERPL-CCNC: 13 U/L
ANION GAP SERPL CALC-SCNC: 21 MMOL/L
APPEARANCE: ABNORMAL
AST SERPL-CCNC: 21 U/L
BACTERIA: ABNORMAL
BASOPHILS # BLD AUTO: 0.09 K/UL
BASOPHILS NFR BLD AUTO: 0.8 %
BILIRUB SERPL-MCNC: 0.3 MG/DL
BILIRUBIN URINE: NEGATIVE
BLOOD URINE: NEGATIVE
BUN SERPL-MCNC: 33 MG/DL
CALCIUM SERPL-MCNC: 9.8 MG/DL
CHLORIDE SERPL-SCNC: 100 MMOL/L
CHOLEST SERPL-MCNC: 181 MG/DL
CHOLEST/HDLC SERPL: 2.1 RATIO
CMV IGG SERPL QL: 2.7 U/ML
CMV IGG SERPL-IMP: POSITIVE
CO2 SERPL-SCNC: 22 MMOL/L
COLOR: YELLOW
CREAT SERPL-MCNC: 7.57 MG/DL
CREAT SPEC-SCNC: 120 MG/DL
CREAT/PROT UR: 2.1 RATIO
EBV DNA SERPL NAA+PROBE-ACNC: NOT DETECTED IU/ML
EBV EA AB SER IA-ACNC: 39.4 U/ML
EBV EA AB TITR SER IF: POSITIVE
EBV EA IGG SER QL IA: 56.9 U/ML
EBV EA IGG SER-ACNC: POSITIVE
EBV EA IGM SER IA-ACNC: NEGATIVE
EBV PATRN SPEC IB-IMP: NORMAL
EBV VCA IGG SER IA-ACNC: 65.8 U/ML
EBV VCA IGM SER QL IA: <10 U/ML
EOSINOPHIL # BLD AUTO: 0.35 K/UL
EOSINOPHIL NFR BLD AUTO: 3.3 %
EPSTEIN-BARR VIRUS CAPSID ANTIGEN IGG: POSITIVE
ESTIMATED AVERAGE GLUCOSE: 88 MG/DL
GLUCOSE QUALITATIVE U: NEGATIVE
GLUCOSE SERPL-MCNC: 92 MG/DL
HAV IGM SER QL: ABNORMAL
HBA1C MFR BLD HPLC: 4.7 %
HBV CORE IGG+IGM SER QL: NONREACTIVE
HBV SURFACE AB SER QL: REACTIVE
HBV SURFACE AB SERPL IA-ACNC: 541.6 MIU/ML
HBV SURFACE AG SER QL: NONREACTIVE
HCG SERPL-MCNC: 1 MIU/ML
HCT VFR BLD CALC: 39 %
HCV AB SER QL: NONREACTIVE
HCV S/CO RATIO: 0.15 S/CO
HDLC SERPL-MCNC: 84 MG/DL
HEPATITIS A IGG ANTIBODY: REACTIVE
HGB BLD-MCNC: 11.5 G/DL
HIV1+2 AB SPEC QL IA.RAPID: NONREACTIVE
HSV 1+2 IGG SER IA-IMP: POSITIVE
HSV 1+2 IGG SER IA-IMP: POSITIVE
HSV1 IGG SER QL: 59.8 INDEX
HSV2 IGG SER QL: 5.21 INDEX
HYALINE CASTS: 1 /LPF
IMM GRANULOCYTES NFR BLD AUTO: 0.3 %
KETONES URINE: NEGATIVE
LDLC SERPL CALC-MCNC: 77 MG/DL
LEUKOCYTE ESTERASE URINE: NEGATIVE
LYMPHOCYTES # BLD AUTO: 3.05 K/UL
LYMPHOCYTES NFR BLD AUTO: 28.7 %
M TB IFN-G BLD-IMP: NEGATIVE
MAGNESIUM SERPL-MCNC: 2.5 MG/DL
MAN DIFF?: NORMAL
MCHC RBC-ENTMCNC: 28.8 PG
MCHC RBC-ENTMCNC: 29.5 GM/DL
MCV RBC AUTO: 97.5 FL
MICROSCOPIC-UA: NORMAL
MONOCYTES # BLD AUTO: 0.61 K/UL
MONOCYTES NFR BLD AUTO: 5.7 %
NEUTROPHILS # BLD AUTO: 6.49 K/UL
NEUTROPHILS NFR BLD AUTO: 61.2 %
NITRITE URINE: NEGATIVE
PH URINE: 8.5
PHOSPHATE SERPL-MCNC: 4.4 MG/DL
PLATELET # BLD AUTO: 294 K/UL
POTASSIUM SERPL-SCNC: 3.8 MMOL/L
PROT SERPL-MCNC: 7.7 G/DL
PROT UR-MCNC: 248 MG/DL
PROTEIN URINE: ABNORMAL
QUANTIFERON TB PLUS MITOGEN MINUS NIL: 5.99 IU/ML
QUANTIFERON TB PLUS NIL: 0.01 IU/ML
QUANTIFERON TB PLUS TB1 MINUS NIL: 0 IU/ML
QUANTIFERON TB PLUS TB2 MINUS NIL: 0 IU/ML
RBC # BLD: 4 M/UL
RBC # FLD: 15.9 %
RED BLOOD CELLS URINE: 8 /HPF
RUBV IGG FLD-ACNC: 12.3 INDEX
RUBV IGG SER-IMP: POSITIVE
SARS-COV-2 IGG SERPL IA-ACNC: 0.02 INDEX
SARS-COV-2 IGG SERPL QL IA: NEGATIVE
SODIUM SERPL-SCNC: 143 MMOL/L
SPECIFIC GRAVITY URINE: 1.01
SQUAMOUS EPITHELIAL CELLS: 14 /HPF
T GONDII AB SER-IMP: POSITIVE
T GONDII IGG SER QL: 12.2 IU/ML
T PALLIDUM AB SER QL IA: NEGATIVE
TRIGL SERPL-MCNC: 96 MG/DL
UROBILINOGEN URINE: NORMAL
VZV AB TITR SER: POSITIVE
VZV IGG SER IF-ACNC: 306.4 INDEX
WBC # FLD AUTO: 10.62 K/UL
WHITE BLOOD CELLS URINE: 7 /HPF

## 2020-10-20 ENCOUNTER — LABORATORY RESULT (OUTPATIENT)
Age: 35
End: 2020-10-20

## 2020-10-20 ENCOUNTER — APPOINTMENT (OUTPATIENT)
Dept: DISASTER EMERGENCY | Facility: CLINIC | Age: 35
End: 2020-10-20

## 2020-10-23 ENCOUNTER — APPOINTMENT (OUTPATIENT)
Dept: ENDOVASCULAR SURGERY | Facility: CLINIC | Age: 35
End: 2020-10-23
Payer: MEDICAID

## 2020-10-30 ENCOUNTER — EMERGENCY (EMERGENCY)
Facility: HOSPITAL | Age: 35
LOS: 1 days | Discharge: HOME | End: 2020-10-30
Attending: EMERGENCY MEDICINE | Admitting: EMERGENCY MEDICINE
Payer: MEDICAID

## 2020-10-30 VITALS
RESPIRATION RATE: 22 BRPM | SYSTOLIC BLOOD PRESSURE: 105 MMHG | TEMPERATURE: 98 F | HEIGHT: 55 IN | OXYGEN SATURATION: 99 % | DIASTOLIC BLOOD PRESSURE: 67 MMHG | HEART RATE: 102 BPM

## 2020-10-30 DIAGNOSIS — F32.9 MAJOR DEPRESSIVE DISORDER, SINGLE EPISODE, UNSPECIFIED: ICD-10-CM

## 2020-10-30 DIAGNOSIS — Z87.81 PERSONAL HISTORY OF (HEALED) TRAUMATIC FRACTURE: Chronic | ICD-10-CM

## 2020-10-30 DIAGNOSIS — Z87.59 PERSONAL HISTORY OF OTHER COMPLICATIONS OF PREGNANCY, CHILDBIRTH AND THE PUERPERIUM: Chronic | ICD-10-CM

## 2020-10-30 PROCEDURE — 90792 PSYCH DIAG EVAL W/MED SRVCS: CPT

## 2020-10-30 PROCEDURE — 99283 EMERGENCY DEPT VISIT LOW MDM: CPT

## 2020-10-30 NOTE — ED PROVIDER NOTE - OBJECTIVE STATEMENT
36 yo F with polycystic kidney disease with one atrophic kidney (pt was unsure which kidney is atrophic), hypothyroidism, and depression, ESRD on HD x 2 months arrives with anxiety and depressive symptoms.  Pt reports having increasing anxiety and depressive symptoms, reports getting scared after HD and started hyperventilating.  Pt reports feeling shaky and uncomfortable.  Reports stopping her medications last week.  No fever/chills, No photophobia/eye pain/changes in vision, No ear pain/sore throat/dysphagia, No chest pain/palpitations, no SOB/cough/wheeze/stridor, No abd pain, No N/V/D, no dysuria/frequency/discharge, No neck/back pain, no rash, no changes in neurological status/function.    Completed full session of HD today. 34 yo F with polycystic kidney disease with one atrophic kidney (pt was unsure which kidney is atrophic), hypothyroidism, and depression, ESRD on HD x 2 months arrives with anxiety and depressive symptoms.  Pt reports having increasing anxiety and depressive symptoms, reports getting scared after HD and started hyperventilating.  Pt reports feeling shaky and uncomfortable.  Reports stopping her medications last week.  No fever/chills, No photophobia/eye pain/changes in vision, No ear pain/sore throat/dysphagia, No chest pain/palpitations, no SOB/cough/wheeze/stridor, No abd pain, No N/V/D, no dysuria/frequency/discharge, No neck/back pain, no rash, no changes in neurological status/function.    Completed full session of HD today.    Discussed with boyfriend Brad Steward, patient noted to become more "catatonic" over the past few weeks and ran out Proxac 4 weeks ago, which he refilled last week.  Noted to have 2 hospitalizations in the past (possible CPEP evaluation) for depressive symptoms.  Noted to have decreased PO intake and periods of sporadic and spontaneous crying.  Pt at times unwilling to engage in conversation or social interactions.  Noted to become more anxious over the past 2-3 weeks.
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
## 2020-10-30 NOTE — ED ADULT NURSE REASSESSMENT NOTE - NS ED NURSE REASSESS COMMENT FT1
Pt cleared for d/c by NP, pt received d/c instructions. Pt denies S/I H/I. Pt reported that she will call friend for pickup.
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
## 2020-10-30 NOTE — ED PROVIDER NOTE - CLINICAL SUMMARY MEDICAL DECISION MAKING FREE TEXT BOX
34 yo F with polycystic kidney disease with one atrophic kidney (pt was unsure which kidney is atrophic), hypothyroidism, and depression, ESRD on HD x 2 months arrives with anxiety and depressive symptoms.  Noted to stop taking antidepressant x 1 week.

## 2020-10-30 NOTE — ED PROVIDER NOTE - PHYSICAL EXAMINATION
GEN - NAD; well groomed; A+O x3; tearful in triage  CARD -s1s2, RRR, no M,G,R;   PULM - CTA b/l, symmetric breath sounds;   ABD -  +BS, ND, NT, soft, no guarding, no rebound, no masses;   BACK - no CVA tenderness, Normal  spine;   EXT - symmetric pulses, 2+ dp, capillary refill < 2 seconds, no cyanosis, no edema;   NEURO - no focal neuro deficits, no slurred speech

## 2020-10-30 NOTE — ED PROVIDER NOTE - PMH
Attention deficit hyperactivity disorder (ADHD)    ESRD (end stage renal disease) on dialysis    OCD (obsessive compulsive disorder)    Polycystic kidney disease

## 2020-10-30 NOTE — ED ADULT NURSE NOTE - OBJECTIVE STATEMENT
Received pt in  pt c/o depression denies si/hi/avh presently emotional reassurance provided safety & comfort measures maintained eval on going.

## 2020-10-30 NOTE — ED ADULT TRIAGE NOTE - CHIEF COMPLAINT QUOTE
had a depression screen while in dialysis  has not  been on Prozac for 1 week  and stopped other meds
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
## 2020-10-30 NOTE — ED PROVIDER NOTE - CCCP TRG CHIEF CMPLNT
feels panic attack  starting this am  has dialysis M/W/F   has had a flat affect dialysis was completed R [facility] dialysis port pt arrives crying and shaking arms/depression

## 2020-10-30 NOTE — ED BEHAVIORAL HEALTH ASSESSMENT NOTE - DESCRIPTION
calm, in good behavioral control    Vital Signs Last 24 Hrs  T(C): 36.9 (30 Oct 2020 09:45), Max: 36.9 (30 Oct 2020 09:45)  T(F): 98.4 (30 Oct 2020 09:45), Max: 98.4 (30 Oct 2020 09:45)  HR: 102 (30 Oct 2020 09:45) (102 - 102)  BP: 105/67 (30 Oct 2020 09:45) (105/67 - 105/67)  BP(mean): --  RR: 22 (30 Oct 2020 09:45) (22 - 22)  SpO2: 99% (30 Oct 2020 09:45) (99% - 99%) see HPI

## 2020-10-30 NOTE — ED ADULT TRIAGE NOTE - CCCP TRG CHIEF CMPLNT
depression/feels panic attack  starting this am  has dialysis M/W/F   has had a flat affect dialysis was completed [facility] dialysis port pt arrives crying and shaking arms

## 2020-10-30 NOTE — ED PROVIDER NOTE - NSFOLLOWUPCLINICS_GEN_ALL_ED_FT
[facility]  Behavioral Health  [id number] [place] [address]  Phone: [phone]  Fax:   Follow Up Time:

## 2020-11-02 ENCOUNTER — EMERGENCY (EMERGENCY)
Facility: HOSPITAL | Age: 35
LOS: 1 days | Discharge: HOME | End: 2020-11-02
Attending: EMERGENCY MEDICINE | Admitting: EMERGENCY MEDICINE
Payer: COMMERCIAL

## 2020-11-02 ENCOUNTER — LABORATORY RESULT (OUTPATIENT)
Age: 35
End: 2020-11-02

## 2020-11-02 VITALS
HEART RATE: 87 BPM | RESPIRATION RATE: 17 BRPM | TEMPERATURE: 98 F | DIASTOLIC BLOOD PRESSURE: 73 MMHG | SYSTOLIC BLOOD PRESSURE: 107 MMHG | HEIGHT: 55 IN | OXYGEN SATURATION: 100 %

## 2020-11-02 DIAGNOSIS — Z87.59 PERSONAL HISTORY OF OTHER COMPLICATIONS OF PREGNANCY, CHILDBIRTH AND THE PUERPERIUM: Chronic | ICD-10-CM

## 2020-11-02 DIAGNOSIS — Z87.81 PERSONAL HISTORY OF (HEALED) TRAUMATIC FRACTURE: Chronic | ICD-10-CM

## 2020-11-02 PROBLEM — N18.6 END STAGE RENAL DISEASE: Chronic | Status: ACTIVE | Noted: 2020-10-30

## 2020-11-02 LAB
ALBUMIN SERPL ELPH-MCNC: 4.2 G/DL — SIGNIFICANT CHANGE UP (ref 3.3–5)
ALP SERPL-CCNC: 107 U/L — SIGNIFICANT CHANGE UP (ref 40–120)
ALT FLD-CCNC: 7 U/L — SIGNIFICANT CHANGE UP (ref 4–33)
AMPHET UR-MCNC: NEGATIVE — SIGNIFICANT CHANGE UP
ANION GAP SERPL CALC-SCNC: 10 MMO/L — SIGNIFICANT CHANGE UP (ref 7–14)
APAP SERPL-MCNC: < 15 UG/ML — LOW (ref 15–25)
APPEARANCE UR: SIGNIFICANT CHANGE UP
AST SERPL-CCNC: 18 U/L — SIGNIFICANT CHANGE UP (ref 4–32)
BACTERIA # UR AUTO: SIGNIFICANT CHANGE UP
BARBITURATES UR SCN-MCNC: NEGATIVE — SIGNIFICANT CHANGE UP
BASOPHILS # BLD AUTO: 0.08 K/UL — SIGNIFICANT CHANGE UP (ref 0–0.2)
BASOPHILS NFR BLD AUTO: 1 % — SIGNIFICANT CHANGE UP (ref 0–2)
BENZODIAZ UR-MCNC: NEGATIVE — SIGNIFICANT CHANGE UP
BILIRUB SERPL-MCNC: 0.4 MG/DL — SIGNIFICANT CHANGE UP (ref 0.2–1.2)
BILIRUB UR-MCNC: NEGATIVE — SIGNIFICANT CHANGE UP
BLOOD UR QL VISUAL: NEGATIVE — SIGNIFICANT CHANGE UP
BUN SERPL-MCNC: 12 MG/DL — SIGNIFICANT CHANGE UP (ref 7–23)
CALCIUM SERPL-MCNC: 8.9 MG/DL — SIGNIFICANT CHANGE UP (ref 8.4–10.5)
CANNABINOIDS UR-MCNC: POSITIVE — SIGNIFICANT CHANGE UP
CHLORIDE SERPL-SCNC: 97 MMOL/L — LOW (ref 98–107)
CO2 SERPL-SCNC: 32 MMOL/L — HIGH (ref 22–31)
COCAINE METAB.OTHER UR-MCNC: NEGATIVE — SIGNIFICANT CHANGE UP
COLOR SPEC: SIGNIFICANT CHANGE UP
CREAT SERPL-MCNC: 5.08 MG/DL — HIGH (ref 0.5–1.3)
EOSINOPHIL # BLD AUTO: 0.3 K/UL — SIGNIFICANT CHANGE UP (ref 0–0.5)
EOSINOPHIL NFR BLD AUTO: 3.6 % — SIGNIFICANT CHANGE UP (ref 0–6)
ETHANOL BLD-MCNC: < 10 MG/DL — SIGNIFICANT CHANGE UP
GLUCOSE SERPL-MCNC: 86 MG/DL — SIGNIFICANT CHANGE UP (ref 70–99)
GLUCOSE UR-MCNC: NEGATIVE — SIGNIFICANT CHANGE UP
HCG SERPL-ACNC: < 5 MIU/ML — SIGNIFICANT CHANGE UP
HCT VFR BLD CALC: 37 % — SIGNIFICANT CHANGE UP (ref 34.5–45)
HGB BLD-MCNC: 11.6 G/DL — SIGNIFICANT CHANGE UP (ref 11.5–15.5)
IMM GRANULOCYTES NFR BLD AUTO: 0.2 % — SIGNIFICANT CHANGE UP (ref 0–1.5)
KETONES UR-MCNC: NEGATIVE — SIGNIFICANT CHANGE UP
LEUKOCYTE ESTERASE UR-ACNC: NEGATIVE — SIGNIFICANT CHANGE UP
LYMPHOCYTES # BLD AUTO: 2.92 K/UL — SIGNIFICANT CHANGE UP (ref 1–3.3)
LYMPHOCYTES # BLD AUTO: 35.4 % — SIGNIFICANT CHANGE UP (ref 13–44)
MCHC RBC-ENTMCNC: 28.8 PG — SIGNIFICANT CHANGE UP (ref 27–34)
MCHC RBC-ENTMCNC: 31.4 % — LOW (ref 32–36)
MCV RBC AUTO: 91.8 FL — SIGNIFICANT CHANGE UP (ref 80–100)
METHADONE UR-MCNC: NEGATIVE — SIGNIFICANT CHANGE UP
MONOCYTES # BLD AUTO: 0.49 K/UL — SIGNIFICANT CHANGE UP (ref 0–0.9)
MONOCYTES NFR BLD AUTO: 5.9 % — SIGNIFICANT CHANGE UP (ref 2–14)
NEUTROPHILS # BLD AUTO: 4.45 K/UL — SIGNIFICANT CHANGE UP (ref 1.8–7.4)
NEUTROPHILS NFR BLD AUTO: 53.9 % — SIGNIFICANT CHANGE UP (ref 43–77)
NITRITE UR-MCNC: NEGATIVE — SIGNIFICANT CHANGE UP
NRBC # FLD: 0 K/UL — SIGNIFICANT CHANGE UP (ref 0–0)
OPIATES UR-MCNC: NEGATIVE — SIGNIFICANT CHANGE UP
OXYCODONE UR-MCNC: NEGATIVE — SIGNIFICANT CHANGE UP
PCP UR-MCNC: NEGATIVE — SIGNIFICANT CHANGE UP
PH UR: 8 — SIGNIFICANT CHANGE UP (ref 5–8)
PLATELET # BLD AUTO: 237 K/UL — SIGNIFICANT CHANGE UP (ref 150–400)
PMV BLD: 11.6 FL — SIGNIFICANT CHANGE UP (ref 7–13)
POTASSIUM SERPL-MCNC: 2.9 MMOL/L — CRITICAL LOW (ref 3.5–5.3)
POTASSIUM SERPL-SCNC: 2.9 MMOL/L — CRITICAL LOW (ref 3.5–5.3)
PROT SERPL-MCNC: 7.5 G/DL — SIGNIFICANT CHANGE UP (ref 6–8.3)
PROT UR-MCNC: 300 — HIGH
RBC # BLD: 4.03 M/UL — SIGNIFICANT CHANGE UP (ref 3.8–5.2)
RBC # FLD: 14.4 % — SIGNIFICANT CHANGE UP (ref 10.3–14.5)
RBC CASTS # UR COMP ASSIST: SIGNIFICANT CHANGE UP (ref 0–?)
SALICYLATES SERPL-MCNC: < 5 MG/DL — LOW (ref 15–30)
SODIUM SERPL-SCNC: 139 MMOL/L — SIGNIFICANT CHANGE UP (ref 135–145)
SP GR SPEC: 1.01 — SIGNIFICANT CHANGE UP (ref 1–1.04)
SQUAMOUS # UR AUTO: SIGNIFICANT CHANGE UP
TSH SERPL-MCNC: 3.19 UIU/ML — SIGNIFICANT CHANGE UP (ref 0.27–4.2)
UROBILINOGEN FLD QL: NORMAL — SIGNIFICANT CHANGE UP
WBC # BLD: 8.26 K/UL — SIGNIFICANT CHANGE UP (ref 3.8–10.5)
WBC # FLD AUTO: 8.26 K/UL — SIGNIFICANT CHANGE UP (ref 3.8–10.5)
WBC UR QL: SIGNIFICANT CHANGE UP (ref 0–?)

## 2020-11-02 PROCEDURE — 90792 PSYCH DIAG EVAL W/MED SRVCS: CPT

## 2020-11-02 PROCEDURE — 99283 EMERGENCY DEPT VISIT LOW MDM: CPT

## 2020-11-02 NOTE — ED PROVIDER NOTE - CLINICAL SUMMARY MEDICAL DECISION MAKING FREE TEXT BOX
36 yo F pmh cystic kidney disease on HD (MWF), depression on prozac, OCD, ADHD denies previous psych hospitalizations presents for suicidal ideation.  VSS.  Exam non-toxic appearing, aaox3.  Denies self harm today.  Will send labs,  consult.  Dispo pending.

## 2020-11-02 NOTE — ED BEHAVIORAL HEALTH ASSESSMENT NOTE - ACTIVATING EVENTS/STRESSORS
Hopeless about or dissatisfied with provider or treatment/Triggering events leading to humiliation, shame, and/or despair (e.g. Loss of relationship, financial or health status) (real or anticipated)

## 2020-11-02 NOTE — ED PROVIDER NOTE - PROGRESS NOTE DETAILS
OSVALDO:  No indication for psych admission per .  Patient potassium 2.9, ekg without concerning changes.  Patient asymptomatic.  Given ESRD, will defer supplementation at this time.  Patient scheduled for HD in two days.  Will d/c.
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
## 2020-11-02 NOTE — ED PROVIDER NOTE - NSFOLLOWUPINSTRUCTIONS_ED_ALL_ED_FT
1.  Please return to care for wanting to harm yourself or others.    2.  Please take medications as prescribed.   3.  Return to dialysis in two days as scheduled.

## 2020-11-02 NOTE — ED PROVIDER NOTE - PHYSICAL EXAMINATION
GEN:  Non-toxic appearing, non-distressed, speaking full sentences, non-diaphoretic, AAOx3  HEENT:  NCAT, neck supple, EOMI, PERRLA, sclera anicteric, no conjunctival pallor or injection, no stridor, normal voice, no tonsillar exudate, uvula midline, tympanic membranes and external auditory canals normal appearing bilaterally   CV:  regular rhythm and rate, s1/s2 audible, no murmurs, rubs or gallops, peripheral pulses 2+ and symmetric  PULM:  non-labored respirations, lungs clear to auscultation bilaterally, no wheezes, crackles or rales  ABD:  non distended, non-tender, no rebound, no guarding, negative Chu's sign, bowel sounds normal, no cvat  MSK:  no gross deformity, non-tender extremities and joints, range of motion grossly normal appearing, no extremity edema, extremities warm and well perfused   NEURO:  AAOx3, CN II-XII intact, motor 5/5 in upper and lower extremities bilaterally, sensation grossly intact in extremities and trunk, finger to nose testing wnl, no nystagmus, negative Romberg, no pronator drift, no gait deficit  SKIN:  warm, dry, no rash or vesicles

## 2020-11-02 NOTE — ED BEHAVIORAL HEALTH ASSESSMENT NOTE - SUICIDE PROTECTIVE FACTORS
Has future plans/Supportive social network of family or friends/Mandaen beliefs/Responsibility to family and others/Identifies reasons for living
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
## 2020-11-02 NOTE — ED BEHAVIORAL HEALTH NOTE - BEHAVIORAL HEALTH NOTE
Writer called pt's boyfriend Brad Steward  who provided the following information.  Pt resides with her boyfriend.  He states he received a call from the  at the Dialysis Center stating she was concerned for depression.  He states on Friday she was almost catatonic and he thought she would be admitted. He states since her ER visit on Friday she was better over the weekend and it was a complete surprise that she was sent to the ER today.    He believes the catalyse for patient being upset today is because her mother visited them over the weekend.    Pt's mother lives in [facility] and they live in [place].  She wants pt to move in with her, but have the boyfriend remain in [place].  He states the mother is guilt tripping patient saying all the other family members are mean to her and wants patient there with her.  He states he's not opposed to it temporarily since he's a nurse 6 days a week managing a clinic in [place] and can't always be there with patient.  He states pt has been on Dialysis for a couple of months and is still adjusting might be better to be around family temporarily.  Pt's father is on Dialysis doesn't know if it would be too much for patient's mother to have two people on dialysis in the home.  Pt would have to move her dialysis center.  He states pt's mother wanted her to pack her things last night.  He states if patient is going to move there has to be a transition period.    He states during the argument yesterday she was arguing with her mother in [place], but he did hear her say in English a comment about pulling the catheter out of her chest.  He states if she is going to be admitted it should be with a plan to link her with therapy.  He doesn't' have immediate safety concerns.    He denies patient ever reports hallucinations, but states in the past patient has talked about flashes of her doing sexual things with people she meets.  He states it's more like OCD of these flashes just coming into her mind, but it hasn't happened in a while since change in medication.

## 2020-11-02 NOTE — ED ADULT TRIAGE NOTE - CHIEF COMPLAINT QUOTE
Pt brought in by EMS from dialysis (full session completed) for suicidal ideation. Pt told EMS she doesn't want to live anymore. When questioned about what happened pt states "I don't want to talk about it" Pt withdrawn in triage. PMH ESRD, PTSD, Depression. Pt denies si/hi/vh/ah in triage

## 2020-11-02 NOTE — ED BEHAVIORAL HEALTH NOTE - BEHAVIORAL HEALTH NOTE
Worker called patient's boyfriend Brad Steward ([phone]) and informed of patient discharge. He states that he will UBER patient home. Worker met with patient and provided information to clinics- [facility], [facility], [facility], and [facility]. Patient states she will UBER home. She states that she is already linked to [facility] but no longer would like to follow up there.

## 2020-11-02 NOTE — ED BEHAVIORAL HEALTH ASSESSMENT NOTE - OTHER
dialysis center lives with boyfriend Patient desires change in Provider and possible medication change, see HPI for more details significant medical issue

## 2020-11-02 NOTE — ED BEHAVIORAL HEALTH ASSESSMENT NOTE - DESCRIPTION
calm, in good behavioral control    Vital Signs Last 24 Hrs  T(C): 36.9 (02 Nov 2020 09:44), Max: 36.9 (02 Nov 2020 09:44)  T(F): 98.4 (02 Nov 2020 09:44), Max: 98.4 (02 Nov 2020 09:44)  HR: 87 (02 Nov 2020 09:44) (87 - 87)  BP: 107/73 (02 Nov 2020 09:44) (107/73 - 107/73)  BP(mean): --  RR: 17 (02 Nov 2020 09:44) (17 - 17)  SpO2: 100% (02 Nov 2020 09:44) (100% - 100%) see HPI polycystic kidney disease and ESRD (with dialysis), hypotension, AV fistula
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
## 2020-11-02 NOTE — ED PROVIDER NOTE - PATIENT PORTAL LINK FT
You can access the FollowMyHealth Patient Portal offered by [facility] by registering at the following website: [email or web address]. By joining [employer]’s FollowMyHealth portal, you will also be able to view your health information using other applications (apps) compatible with our system.

## 2020-11-02 NOTE — ED ADULT NURSE NOTE - OBJECTIVE STATEMENT
Pt sent from dialysis center for depression. Pt states that she is having family issues with boyfriend and mother. Pt on dialysis. Right CW aniceto in place. Pt expressing passive SI. Pt sent from dialysis center for depression. Pt states that she is having family issues with boyfriend and mother. Pt on dialysis. Right CW aniceto in place. Pt expressing passive SI. All belongings accounted for. Will continue to monitor.

## 2020-11-02 NOTE — ED BEHAVIORAL HEALTH ASSESSMENT NOTE - REFERRAL / APPOINTMENT DETAILS
follow-up outpatient follow-up outpatient. resources provided by [facility] for local walk in centers including [facility]

## 2020-11-03 ENCOUNTER — APPOINTMENT (OUTPATIENT)
Dept: DISASTER EMERGENCY | Facility: CLINIC | Age: 35
End: 2020-11-03

## 2020-11-05 NOTE — HISTORY OF PRESENT ILLNESS
[FreeTextEntry1] : 8/19/20 Dr Gomes [FreeTextEntry4] : yesterday  [FreeTextEntry5] : yesterday at [FreeTextEntry6] :

## 2020-11-05 NOTE — PAST MEDICAL HISTORY
[FreeTextEntry1] : Malignant Hyperthermia Screening Tool and Risk of Bleeding Assessment \par \par Ms. EDI ALBA denies family of unexpected death following Anesthesia or Exercise.\par Denies Family history of Malignant Hyperthermia, Muscle or Neuromuscular disorder and High Temperature  following exercise.\par \par Ms. DEI ALBA denies history of Muscle Spasm, Dark or Chocolate- Colored and Unanticipated fever immediately following anaesthesia or serious exercise.\par Ms. EDI ALBA also denies bleeding tendencies/Risks of Bleeding.\par

## 2020-11-06 ENCOUNTER — APPOINTMENT (OUTPATIENT)
Dept: ENDOVASCULAR SURGERY | Facility: CLINIC | Age: 35
End: 2020-11-06
Payer: MEDICAID

## 2020-11-17 ENCOUNTER — APPOINTMENT (OUTPATIENT)
Dept: DISASTER EMERGENCY | Facility: CLINIC | Age: 35
End: 2020-11-17

## 2020-11-18 ENCOUNTER — APPOINTMENT (OUTPATIENT)
Dept: CARDIOLOGY | Facility: CLINIC | Age: 35
End: 2020-11-18
Payer: COMMERCIAL

## 2020-11-18 ENCOUNTER — NON-APPOINTMENT (OUTPATIENT)
Age: 35
End: 2020-11-18

## 2020-11-18 VITALS
WEIGHT: 132 LBS | DIASTOLIC BLOOD PRESSURE: 77 MMHG | TEMPERATURE: 98.1 F | HEART RATE: 91 BPM | OXYGEN SATURATION: 100 % | SYSTOLIC BLOOD PRESSURE: 113 MMHG | BODY MASS INDEX: 26.66 KG/M2

## 2020-11-18 DIAGNOSIS — Z63.5 DISRUPTION OF FAMILY BY SEPARATION AND DIVORCE: ICD-10-CM

## 2020-11-18 DIAGNOSIS — Z78.9 OTHER SPECIFIED HEALTH STATUS: ICD-10-CM

## 2020-11-18 DIAGNOSIS — Z01.818 ENCOUNTER FOR OTHER PREPROCEDURAL EXAMINATION: ICD-10-CM

## 2020-11-18 DIAGNOSIS — F17.200 NICOTINE DEPENDENCE, UNSPECIFIED, UNCOMPLICATED: ICD-10-CM

## 2020-11-18 LAB — SARS-COV-2 N GENE NPH QL NAA+PROBE: NOT DETECTED

## 2020-11-18 PROCEDURE — 99205 OFFICE O/P NEW HI 60 MIN: CPT

## 2020-11-18 PROCEDURE — 93000 ELECTROCARDIOGRAM COMPLETE: CPT

## 2020-11-18 PROCEDURE — 93306 TTE W/DOPPLER COMPLETE: CPT

## 2020-11-18 RX ORDER — FUROSEMIDE 80 MG/1
80 TABLET ORAL DAILY
Qty: 30 | Refills: 3 | Status: DISCONTINUED | COMMUNITY
End: 2020-11-18

## 2020-11-18 RX ORDER — FLUOXETINE HCL 10 MG
10 TABLET ORAL DAILY
Refills: 0 | Status: DISCONTINUED | COMMUNITY
End: 2020-11-18

## 2020-11-18 RX ORDER — POTASSIUM CHLORIDE 1500 MG/1
20 TABLET, EXTENDED RELEASE ORAL DAILY
Qty: 30 | Refills: 2 | Status: DISCONTINUED | COMMUNITY
Start: 2020-09-16 | End: 2020-11-18

## 2020-11-18 RX ORDER — CHOLECALCIFEROL (VITAMIN D3)
CRYSTALS MISCELLANEOUS
Refills: 0 | Status: DISCONTINUED | COMMUNITY
End: 2020-11-18

## 2020-11-18 SDOH — SOCIAL STABILITY - SOCIAL INSECURITY: DISRUPTION OF FAMILY BY SEPARATION AND DIVORCE: Z63.5

## 2020-11-20 ENCOUNTER — APPOINTMENT (OUTPATIENT)
Dept: ENDOVASCULAR SURGERY | Facility: CLINIC | Age: 35
End: 2020-11-20
Payer: MEDICAID

## 2020-11-20 ENCOUNTER — RESULT REVIEW (OUTPATIENT)
Age: 35
End: 2020-11-20

## 2020-11-20 VITALS
HEIGHT: 58 IN | RESPIRATION RATE: 20 BRPM | DIASTOLIC BLOOD PRESSURE: 64 MMHG | OXYGEN SATURATION: 97 % | WEIGHT: 141 LBS | HEART RATE: 82 BPM | BODY MASS INDEX: 29.6 KG/M2 | TEMPERATURE: 97.9 F | SYSTOLIC BLOOD PRESSURE: 95 MMHG

## 2020-11-20 PROCEDURE — 36902Z: CUSTOM | Mod: 59

## 2020-11-20 PROCEDURE — 36011Z: CUSTOM | Mod: 59

## 2020-11-20 PROCEDURE — 36909Z: CUSTOM

## 2020-11-20 NOTE — REASON FOR VISIT
[Other ___] : a [unfilled] visit for [Other: ___] : [unfilled] [Non-Maturing Fistula] : non-maturing fistula [Friend] : friend

## 2020-11-29 PROBLEM — Z01.818 PRE-TRANSPLANT EVALUATION FOR KIDNEY TRANSPLANT: Status: ACTIVE | Noted: 2020-10-05

## 2020-11-29 NOTE — HISTORY OF PRESENT ILLNESS
[FreeTextEntry1] : Patient is a 35 year-old woman, born in [place], [place] the [place] 1990, with family history of ESRD on HD,, no known cardiovascular disease, with ESRD on HD since September 2020 (via right chest wall permacath), presents today for cardiac evaluation prior to possible renal transplant.\par She walks for exercise, and also does yoga.\par No chest pains, shortness of breath, or lower extremity edema. She does get palpitations when anxious, but they are self-limited and resolve with deep breathing exercises.\par No family history of early coronary artery disease or unexplained sudden cardiac death.\par \par She has OCD/anxiety/PTSD and is establishing care with a new psychiatrist on 12/1/2020.\par \par PMD: Shelly Shelton DO [phone]\par Nephrologist: Lulú Serrato MD [phone]

## 2020-11-29 NOTE — DISCUSSION/SUMMARY
[FreeTextEntry1] : 35 year-old woman with ESRD on HD presents for cardiac evaluation prior to possible renal transplant.\par Echocardiogram today shows normal LV function with no evidence of elevated pulmonary pressures.\par Will check exercise stress test to evaluate patient's exercise capacity. Patient is low risk for ischemic heart disease.

## 2020-11-29 NOTE — PHYSICAL EXAM
[General Appearance - Well Developed] : well developed [Normal Appearance] : normal appearance [Well Groomed] : well groomed [General Appearance - Well Nourished] : well nourished [No Deformities] : no deformities [General Appearance - In No Acute Distress] : no acute distress [Conjunctiva] : the conjunctiva were normal in both eyes [PERRL] : pupils were equal in size, round, and reactive to light [EOM Intact] : extraocular movements were intact [Normal Oral Mucosa] : normal oral mucosa [No Oral Pallor] : no oral pallor [No Oral Cyanosis] : no oral cyanosis [Normal Oropharynx] : normal oropharynx [Normal Jugular Venous A Waves Present] : normal jugular venous A waves present [Normal Jugular Venous V Waves Present] : normal jugular venous V waves present [No Jugular Venous Littlejohn A Waves] : no jugular venous littlejohn A waves [5th Left ICS - MCL] : palpated at the 5th LICS in the midclavicular line [Normal] : normal [No Precordial Heave] : no precordial heave was noted [Normal Rate] : normal [Rhythm Regular] : regular [Normal S1] : normal S1 [Normal S2] : normal S2 [No Gallop] : no gallop heard [No Murmur] : no murmurs heard [2+] : right 2+ [No Pitting Edema] : no pitting edema present [] : no respiratory distress [Respiration, Rhythm And Depth] : normal respiratory rhythm and effort [Exaggerated Use Of Accessory Muscles For Inspiration] : no accessory muscle use [Auscultation Breath Sounds / Voice Sounds] : lungs were clear to auscultation bilaterally [Bowel Sounds] : normal bowel sounds [Abdomen Soft] : soft [Abdomen Tenderness] : non-tender [Abnormal Walk] : normal gait [Gait - Sufficient For Exercise Testing] : the gait was sufficient for exercise testing [Nail Clubbing] : no clubbing of the fingernails [Cyanosis, Localized] : no localized cyanosis [Petechial Hemorrhages (___cm)] : no petechial hemorrhages [Skin Color & Pigmentation] : normal skin color and pigmentation [No Venous Stasis] : no venous stasis [No Xanthoma] : no  xanthoma was observed [Oriented To Time, Place, And Person] : oriented to person, place, and time [Impaired Insight] : insight and judgment were intact [Affect] : the affect was normal [Mood] : the mood was normal [No Anxiety] : not feeling anxious [Yellow Sclera (Icteric)] : no scleral icterus was seen [Right Carotid Bruit] : no bruit heard over the right carotid [Left Carotid Bruit] : no bruit heard over the left carotid [FreeTextEntry1] : "I'll be my own savior," on right arm, "Crazy mind, peaceful heart, evergreen soul" on left arm, "infinite balance" on back of her neck

## 2020-12-02 ENCOUNTER — NON-APPOINTMENT (OUTPATIENT)
Age: 35
End: 2020-12-02

## 2020-12-02 DIAGNOSIS — Z20.828 CONTACT WITH AND (SUSPECTED) EXPOSURE TO OTHER VIRAL COMMUNICABLE DISEASES: ICD-10-CM

## 2020-12-02 RX ORDER — LIDOCAINE AND PRILOCAINE 25; 25 MG/G; MG/G
2.5-2.5 CREAM TOPICAL
Qty: 12 | Refills: 3 | Status: ACTIVE | COMMUNITY
Start: 2020-12-02 | End: 1900-01-01

## 2020-12-03 NOTE — ED PROVIDER NOTE - PATIENT PORTAL LINK FT
"  Physical Therapy Treatment Note     Name: Lissa Heckion  Clinic Number: [id number]    Therapy Diagnosis:   Encounter Diagnoses   Name Primary?    Chronic pain of left knee     Walking and running      Physician: Henna Rahman MD    Visit Date: 12/3/2020    Physician Orders: PT Eval and Treat   Medical Diagnosis from Referral: M25.562 (ICD-10-CM) - Left knee pain, unspecified chronicity  Evaluation Date: 10/14/2020  Authorization Period Expiration: 10/2/2021  Plan of Care Expiration: 1/12/2021  Visit # / Visits authorized: 4/ 20    Time In: 9:15  Time Out: 10:00  Total Billable Time: 45 minutes    Precautions: Standard    Subjective     Pt reports: I ran some sprints and suicides and it didn't hurt, but no long distances yet. She notes going to [employer]. Took 10 days off exercise but did do them a couple times since last seen   .  She was compliant with home exercise program.  Response to previous treatment: hips a little stronger  Functional change: ran 1 mile    Pain: 3/10  Location: left knee      Objective     Lissa received therapeutic exercises to develop strength, endurance, ROM, posture and core stabilization for 35 minutes including:    Prone quad stretch with strap and wedge under knee 30 sec 3x  Prone HS curls BTB at ankles 2 x 15  SL hip abduction foot tracing wall with foam roll behind hip 2 x 15 B  SL bridge feet elevated on green box  2x12   Bridge with swiss ball curls 3 x 8  Side plank with top leg slider flexion 2 x 12 B   SL sit to stands 20" box 15x B  SL balance palloff press with vert displacement 3# CC 2 x 15x B      NT  Sidelying hip adduction 2 x 15  Prone hip IR green loop at ankles 2 x 15 B   Squats at 20" box green band at knees 15# KB 2 x 15  Plank with hip abd GTB 2 x 15 B   Plank with bent knee hip extension 2 x 15 B  Captain morgans with swiss ball  3x to fatigue B   Swiss ball flexions lumbar spine 15x    Modified sides plank w/ Hip abd 2x8,1x5  Foot back on wall clams GTB " 2 x 12  Isometrics at hammer 90# 45 sec on/off 5x  SL hip hinges 10# kettle bell - yellow band tactile to avoid valgus 2 x 12 B      Lissa received the following manual therapy techniques: Joint mobilizations and Friction Massage were applied to the: L knee for 10 minutes, including:    Patellar mobs all planes  Hip reassessment  Prone quad stretch 3x with holds at end range    Home Exercises Provided and Patient Education Provided     Education provided:   - Importance of glut strengthening for reduced knee pain  - Isometrics for tendinopathy    Written Home Exercises Provided: yes.  Exercises were reviewed and Lissa was able to demonstrate them prior to the end of the session.  Lissa demonstrated good  understanding of the education provided.     See EMR under Patient Instructions for exercises provided prior visit.    Assessment     Patient progressed with hamstring strengthening and low abd/lumbar stabilization. She continues to be slightly weaker on the L hip with exercises and into the hamstring. Balance she struggles to not internally rotate and over pronate at the ankle.    Lissa is progressing well towards her goals.   Pt prognosis is Excellent.     Pt will continue to benefit from skilled outpatient physical therapy to address the deficits listed in the problem list box on initial evaluation, provide pt/family education and to maximize pt's level of independence in the home and community environment.     Pt's spiritual, cultural and educational needs considered and pt agreeable to plan of care and goals.     Anticipated barriers to physical therapy: covid    GOALS: Short Term Goals:  4 weeks  1.Report decreased knee pain  < / =  2/10  to increase tolerance for exercise in clinic (Progressing, not met)  2. Increase knee ROM to full hyperextension in order to be able to perform ADLs without difficulty.(Progressing, not met)  3. Increase strength by 1/3 MMT grade in gluts and ER  to increase tolerance  for ADL and work activities.(Progressing, not met)  4. Pt to tolerate HEP to improve ROM and independence with ADL's(Progressing, not met)     Long Term Goals: 10 weeks  1.Report decreased knee pain < / = 0/10  to increase tolerance for return to squats   2.Patient goal: return to exercise classes without knee pain  3.Increase strength to >/= 4+/5 in gluts and ER  to increase tolerance for ADL and work activities.  4. Pt will report at CJ level (20-40% impaired) on FOTO knee to demonstrate increase in LE function with every day tasks.      Plan     Plan of care Certification: 10/14/2020 to 1/12/2021.     Outpatient Physical Therapy 2 times weekly for 10 weeks to include the following interventions: Gait Training, Manual Therapy, Moist Heat/ Ice, Neuromuscular Re-ed, Patient Education, Self Care, Therapeutic Activites, Therapeutic Exercise and Dry Needling.     Improve glut strengthening and avoid valgus at knee, isometrics for tendon pain    Evin Tyson, PT      You can access the FollowMyHealth Patient Portal offered by [facility] by registering at the following website: [email or web address]. By joining [email or web address]’s FollowMyHealth portal, you will also be able to view your health information using other applications (apps) compatible with our system.

## 2020-12-05 ENCOUNTER — APPOINTMENT (OUTPATIENT)
Dept: ULTRASOUND IMAGING | Facility: CLINIC | Age: 35
End: 2020-12-05

## 2020-12-05 ENCOUNTER — APPOINTMENT (OUTPATIENT)
Dept: RADIOLOGY | Facility: CLINIC | Age: 35
End: 2020-12-05

## 2020-12-08 ENCOUNTER — APPOINTMENT (OUTPATIENT)
Dept: CARDIOLOGY | Facility: CLINIC | Age: 35
End: 2020-12-08

## 2021-01-13 DIAGNOSIS — I95.9 HYPOTENSION, UNSPECIFIED: ICD-10-CM

## 2021-01-13 RX ORDER — MIDODRINE HYDROCHLORIDE 5 MG/1
5 TABLET ORAL
Qty: 180 | Refills: 3 | Status: ACTIVE | COMMUNITY
Start: 2021-01-13 | End: 1900-01-01

## 2021-01-18 ENCOUNTER — APPOINTMENT (OUTPATIENT)
Dept: CARDIOLOGY | Facility: CLINIC | Age: 36
End: 2021-01-18

## 2021-01-28 ENCOUNTER — APPOINTMENT (OUTPATIENT)
Dept: DISASTER EMERGENCY | Facility: CLINIC | Age: 36
End: 2021-01-28

## 2021-01-29 LAB — SARS-COV-2 N GENE NPH QL NAA+PROBE: NOT DETECTED

## 2021-02-02 ENCOUNTER — APPOINTMENT (OUTPATIENT)
Dept: ENDOVASCULAR SURGERY | Facility: CLINIC | Age: 36
End: 2021-02-02
Payer: MEDICARE

## 2021-02-02 ENCOUNTER — RESULT REVIEW (OUTPATIENT)
Age: 36
End: 2021-02-02

## 2021-02-02 VITALS
TEMPERATURE: 98.6 F | HEART RATE: 82 BPM | BODY MASS INDEX: 27.71 KG/M2 | OXYGEN SATURATION: 97 % | RESPIRATION RATE: 15 BRPM | HEIGHT: 58 IN | SYSTOLIC BLOOD PRESSURE: 98 MMHG | DIASTOLIC BLOOD PRESSURE: 64 MMHG | WEIGHT: 132 LBS

## 2021-02-02 PROCEDURE — 99072 ADDL SUPL MATRL&STAF TM PHE: CPT

## 2021-02-02 PROCEDURE — 36909Z: CUSTOM

## 2021-02-02 PROCEDURE — 36215Z: CUSTOM | Mod: 59

## 2021-02-02 PROCEDURE — 36011Z: CUSTOM | Mod: 59

## 2021-02-02 PROCEDURE — 36902Z: CUSTOM | Mod: 59

## 2021-02-02 NOTE — HISTORY OF PRESENT ILLNESS
[] : right internal jugular tunneled catheter [FreeTextEntry1] : 8/19/20 Dr Gomes [FreeTextEntry2] : 8-19-20 [FreeTextEntry4] : dialysis via tunneled catheter [FreeTextEntry5] : today at 4 am [FreeTextEntry6] : Dr Alonzo

## 2021-02-02 NOTE — REASON FOR VISIT
[Other ___] : a [unfilled] visit for [Difficult Cannulation] : difficult cannulation [Non-Maturing Fistula] : non-maturing fistula [Other: ___] : [unfilled] [Friend] : friend

## 2021-02-02 NOTE — PAST MEDICAL HISTORY
[Increasing age ( >40 years old)] : Increasing age ( >40 years old) [No therapy indicated for cases scheduled for less than one hour] : No therapy indicated for cases scheduled for less than one hour. [FreeTextEntry1] : Malignant Hyperthermia Screening Tool and Risk of Bleeding Assessment \par \par Ms. EDI ALBA denies family of unexpected death following Anesthesia or Exercise.\par Denies Family history of Malignant Hyperthermia, Muscle or Neuromuscular disorder and High Temperature  following exercise.\par Ms. EDI ALBA denies history of Muscle Spasm, Dark or Chocolate- Colored and Unanticipated fever immediately following anaesthesia or serious exercise.\par Ms. EDI ALBA also denies bleeding tendencies/Risks of Bleeding.\par

## 2021-02-03 ENCOUNTER — APPOINTMENT (OUTPATIENT)
Dept: CARDIOLOGY | Facility: CLINIC | Age: 36
End: 2021-02-03
Payer: MEDICARE

## 2021-02-03 PROCEDURE — 93015 CV STRESS TEST SUPVJ I&R: CPT

## 2021-02-03 PROCEDURE — 99072 ADDL SUPL MATRL&STAF TM PHE: CPT

## 2021-02-16 NOTE — REASON FOR VISIT
no cp [Other: ___] : [unfilled] [Other ___] : a [unfilled] visit for [Difficult Cannulation] : difficult cannulation [Non-Maturing Fistula] : non-maturing fistula [Friend] : friend

## 2021-02-16 NOTE — REASON FOR VISIT
[Other: ___] : [unfilled] [Other ___] : a [unfilled] visit for [Difficult Cannulation] : difficult cannulation [Non-Maturing Fistula] : non-maturing fistula [Friend] : friend

## 2021-02-17 ENCOUNTER — APPOINTMENT (OUTPATIENT)
Dept: ENDOVASCULAR SURGERY | Facility: CLINIC | Age: 36
End: 2021-02-17
Payer: MEDICARE

## 2021-02-17 VITALS
BODY MASS INDEX: 27.71 KG/M2 | RESPIRATION RATE: 16 BRPM | HEART RATE: 90 BPM | DIASTOLIC BLOOD PRESSURE: 72 MMHG | TEMPERATURE: 97.6 F | HEIGHT: 58 IN | WEIGHT: 132 LBS | SYSTOLIC BLOOD PRESSURE: 103 MMHG | OXYGEN SATURATION: 98 %

## 2021-02-17 PROCEDURE — 99072 ADDL SUPL MATRL&STAF TM PHE: CPT

## 2021-02-17 PROCEDURE — 93990 DOPPLER FLOW TESTING: CPT

## 2021-02-17 RX ORDER — LEVOTHYROXINE SODIUM 0.03 MG/1
25 TABLET ORAL
Qty: 90 | Refills: 2 | Status: DISCONTINUED | COMMUNITY
Start: 2020-08-17 | End: 2021-02-17

## 2021-02-18 ENCOUNTER — APPOINTMENT (OUTPATIENT)
Dept: VASCULAR SURGERY | Facility: CLINIC | Age: 36
End: 2021-02-18

## 2021-03-01 NOTE — ASSESSMENT
[FreeTextEntry1] :  fistula with difficult cannulation at dialysis. plan for fistulogram and possible intervention.

## 2021-03-01 NOTE — HISTORY OF PRESENT ILLNESS
[] : right internal jugular tunneled catheter [FreeTextEntry1] : 8/19/20 Dr Gomes [FreeTextEntry2] : 8-19-20 [FreeTextEntry4] : dialysis via tunneled catheter [FreeTextEntry5] : today at  [FreeTextEntry6] : Dr Alonzo

## 2021-03-17 ENCOUNTER — APPOINTMENT (OUTPATIENT)
Dept: ENDOVASCULAR SURGERY | Facility: CLINIC | Age: 36
End: 2021-03-17
Payer: MEDICARE

## 2021-03-17 PROCEDURE — 36589 REMOVAL TUNNELED CV CATH: CPT

## 2021-03-17 PROCEDURE — 99072 ADDL SUPL MATRL&STAF TM PHE: CPT

## 2021-03-21 ENCOUNTER — INPATIENT (INPATIENT)
Facility: HOSPITAL | Age: 36
LOS: 4 days | Discharge: HOME | DRG: 683 | End: 2021-03-26
Attending: HOSPITALIST | Admitting: STUDENT IN AN ORGANIZED HEALTH CARE EDUCATION/TRAINING PROGRAM
Payer: MEDICARE

## 2021-03-21 VITALS
HEART RATE: 95 BPM | TEMPERATURE: 98 F | SYSTOLIC BLOOD PRESSURE: 104 MMHG | OXYGEN SATURATION: 100 % | RESPIRATION RATE: 20 BRPM | WEIGHT: 122.8 LBS | HEIGHT: 59 IN | DIASTOLIC BLOOD PRESSURE: 62 MMHG

## 2021-03-21 DIAGNOSIS — D64.9 ANEMIA, UNSPECIFIED: ICD-10-CM

## 2021-03-21 DIAGNOSIS — N18.6 END STAGE RENAL DISEASE: ICD-10-CM

## 2021-03-21 DIAGNOSIS — Z91.15 PATIENT'S NONCOMPLIANCE WITH RENAL DIALYSIS: ICD-10-CM

## 2021-03-21 DIAGNOSIS — Z87.81 PERSONAL HISTORY OF (HEALED) TRAUMATIC FRACTURE: Chronic | ICD-10-CM

## 2021-03-21 DIAGNOSIS — Z87.59 PERSONAL HISTORY OF OTHER COMPLICATIONS OF PREGNANCY, CHILDBIRTH AND THE PUERPERIUM: Chronic | ICD-10-CM

## 2021-03-21 DIAGNOSIS — Z29.9 ENCOUNTER FOR PROPHYLACTIC MEASURES, UNSPECIFIED: ICD-10-CM

## 2021-03-21 LAB
ALBUMIN SERPL ELPH-MCNC: 4.1 G/DL — SIGNIFICANT CHANGE UP (ref 3.3–5)
ALP SERPL-CCNC: 53 U/L — SIGNIFICANT CHANGE UP (ref 40–120)
ALT FLD-CCNC: 6 U/L — LOW (ref 10–45)
ANION GAP SERPL CALC-SCNC: 17 MMOL/L — SIGNIFICANT CHANGE UP (ref 5–17)
AST SERPL-CCNC: 10 U/L — SIGNIFICANT CHANGE UP (ref 10–40)
BASOPHILS # BLD AUTO: 0.08 K/UL — SIGNIFICANT CHANGE UP (ref 0–0.2)
BASOPHILS NFR BLD AUTO: 1.2 % — SIGNIFICANT CHANGE UP (ref 0–2)
BILIRUB SERPL-MCNC: 0.3 MG/DL — SIGNIFICANT CHANGE UP (ref 0.2–1.2)
BUN SERPL-MCNC: 54 MG/DL — HIGH (ref 7–23)
CALCIUM SERPL-MCNC: 9.2 MG/DL — SIGNIFICANT CHANGE UP (ref 8.4–10.5)
CHLORIDE SERPL-SCNC: 102 MMOL/L — SIGNIFICANT CHANGE UP (ref 96–108)
CO2 SERPL-SCNC: 24 MMOL/L — SIGNIFICANT CHANGE UP (ref 22–31)
CREAT SERPL-MCNC: 16.66 MG/DL — HIGH (ref 0.5–1.3)
EOSINOPHIL # BLD AUTO: 0.4 K/UL — SIGNIFICANT CHANGE UP (ref 0–0.5)
EOSINOPHIL NFR BLD AUTO: 6.2 % — HIGH (ref 0–6)
GLUCOSE SERPL-MCNC: 86 MG/DL — SIGNIFICANT CHANGE UP (ref 70–99)
HCT VFR BLD CALC: 29.9 % — LOW (ref 34.5–45)
HGB BLD-MCNC: 9.4 G/DL — LOW (ref 11.5–15.5)
IMM GRANULOCYTES NFR BLD AUTO: 0.2 % — SIGNIFICANT CHANGE UP (ref 0–1.5)
LYMPHOCYTES # BLD AUTO: 3.01 K/UL — SIGNIFICANT CHANGE UP (ref 1–3.3)
LYMPHOCYTES # BLD AUTO: 46.3 % — HIGH (ref 13–44)
MCHC RBC-ENTMCNC: 30.7 PG — SIGNIFICANT CHANGE UP (ref 27–34)
MCHC RBC-ENTMCNC: 31.4 GM/DL — LOW (ref 32–36)
MCV RBC AUTO: 97.7 FL — SIGNIFICANT CHANGE UP (ref 80–100)
MONOCYTES # BLD AUTO: 0.42 K/UL — SIGNIFICANT CHANGE UP (ref 0–0.9)
MONOCYTES NFR BLD AUTO: 6.5 % — SIGNIFICANT CHANGE UP (ref 2–14)
NEUTROPHILS # BLD AUTO: 2.58 K/UL — SIGNIFICANT CHANGE UP (ref 1.8–7.4)
NEUTROPHILS NFR BLD AUTO: 39.6 % — LOW (ref 43–77)
NRBC # BLD: 0 /100 WBCS — SIGNIFICANT CHANGE UP (ref 0–0)
PLATELET # BLD AUTO: 250 K/UL — SIGNIFICANT CHANGE UP (ref 150–400)
POTASSIUM SERPL-MCNC: 3.5 MMOL/L — SIGNIFICANT CHANGE UP (ref 3.5–5.3)
POTASSIUM SERPL-SCNC: 3.5 MMOL/L — SIGNIFICANT CHANGE UP (ref 3.5–5.3)
PROT SERPL-MCNC: 6.9 G/DL — SIGNIFICANT CHANGE UP (ref 6–8.3)
RBC # BLD: 3.06 M/UL — LOW (ref 3.8–5.2)
RBC # FLD: 14.1 % — SIGNIFICANT CHANGE UP (ref 10.3–14.5)
SARS-COV-2 RNA SPEC QL NAA+PROBE: SIGNIFICANT CHANGE UP
SODIUM SERPL-SCNC: 143 MMOL/L — SIGNIFICANT CHANGE UP (ref 135–145)
WBC # BLD: 6.5 K/UL — SIGNIFICANT CHANGE UP (ref 3.8–10.5)
WBC # FLD AUTO: 6.5 K/UL — SIGNIFICANT CHANGE UP (ref 3.8–10.5)

## 2021-03-21 PROCEDURE — 99285 EMERGENCY DEPT VISIT HI MDM: CPT | Mod: CS

## 2021-03-21 PROCEDURE — 93010 ELECTROCARDIOGRAM REPORT: CPT

## 2021-03-21 PROCEDURE — 99222 1ST HOSP IP/OBS MODERATE 55: CPT

## 2021-03-21 RX ORDER — HEPARIN SODIUM 5000 [USP'U]/ML
5000 INJECTION INTRAVENOUS; SUBCUTANEOUS EVERY 12 HOURS
Refills: 0 | Status: DISCONTINUED | OUTPATIENT
Start: 2021-03-21 | End: 2021-03-26

## 2021-03-21 RX ORDER — MIDODRINE HYDROCHLORIDE 2.5 MG/1
5 TABLET ORAL
Refills: 0 | Status: DISCONTINUED | OUTPATIENT
Start: 2021-03-21 | End: 2021-03-26

## 2021-03-21 RX ORDER — CALCIUM ACETATE 667 MG
667 TABLET ORAL
Refills: 0 | Status: DISCONTINUED | OUTPATIENT
Start: 2021-03-21 | End: 2021-03-26

## 2021-03-21 RX ADMIN — Medication 667 MILLIGRAM(S): at 20:04

## 2021-03-21 NOTE — ED PROVIDER NOTE - PHYSICAL EXAMINATION
Gen: well appearing young female NAD   HEENT: NCAT, EOMI, no nasal discharge, mucous membranes moist  CV: RRR, +S1/S2, no M/R/G  Resp: CTAB, no W/R/R  GI: Abdomen soft non-distended, NTTP, no masses  MSK: No open wounds, no bruising, no pitting edema to b/l LEs   Neuro: A&Ox4, following commands, moving all four extremities spontaneously  Psych: appropriate mood

## 2021-03-21 NOTE — H&P ADULT - ASSESSMENT
Outpatient Physical Therapy        [x] Phone: [phone]   Fax: [phone]   Pediatric Therapy          [] Phone: [phone]   Fax: [phone]  Pediatric [facility]          [] Phone: [phone]   Fax: [phone]      To: Referring Practitioner: Dr. Shayna Allen    From: Lauryn Bermeo, PT     Patient: Eryn Pemberton       : 1942  Diagnosis: M75.51, m75.41, S46. 001a   Treatment Diagnosis: Rotator cuff tendonitis, bursitis, pain. Date: 2020    Physical Therapy Certification/Re-Certification Form  Dear Dr. Shayna Allen  The following patient has been evaluated for physical therapy services and for therapy to continue, Please review the attached evaluation and/or summary of the patient's plan of care, and verify that you agree therapy should continue by signing the attached document and sending it back to our office. Patient is a  79 yo male who presents with nearly resolved R shoulder pain which impacts on adls minimally at this time;patient's goal is to prevent R shoulder pain ;patient reports that pain had limited activities including driving, lifting, dressing, reaching, but these are no longer painful since steroid injection; PT to address patient's goals, impairments and activity limitations with skilled interventions checked in plan of care;patient's level of function prior to onset of R shoulder pain was independent; did not observe any barriers to learning during PT eval; learning preferences include demonstration, practice, and handouts; patient expressed understanding of HEP; patient appears to be motivated to participate in an active PT program and to be compliant with HEP expectations;patient assisted in developing treatment plan and goals; no DME is currently being used;      Current functional level (based on DASH )   :21 raw score    Assessment:   Pt presents with history of R shoulder pain dating back 5-6 months.   Symptoms have nearly fully resolved with recent steroid 35F with PMH of ADPCKD on HD via LUE AVF and OCD who comes in for HD and in need of changing HD center

## 2021-03-21 NOTE — ED PROVIDER NOTE - PROGRESS NOTE DETAILS
Ford, PGY2 - spoke with Dr. Juan Champion, pt's nephrologist, discussed case, agrees with admission for HD. will come see pt today. Ford, PGY2 - spoke with Dr. Juan Champion, pt's nephrologist, discussed case, agrees with admission for HD. will come see pt today.    Attending MD Tony: As per nephrologist, will need HD here as patient not set up to have HD anywhere at this time.

## 2021-03-21 NOTE — ED ADULT NURSE NOTE - OBJECTIVE STATEMENT
36 y/o female pmh PTSD, OCD, ADHD, polycystic kidney disease, ESRD dialysis T/TR/Sat, Left AV fistula, arrives to ED requesting to have dialysis. Patient reports she has not had dialysis since Tuesday, states she went to the facility where she has HD and was "not comfortable", and "not pleased" with the care provided. Pt states she was not trusting of facility. Patient reports feeling more tired when walking, and reports having increased fluid retention, dehydration. Patient states she has been limiting her PO intake due to not wanting to increase fluid retention. Patient is a/ox4, anxious appearance, respirations unlabored, abdomen soft NT/ND. No n/v/d. no urinary symptoms, states she has minimal urinary output. States second dose of Moderna Vaccine is 4/5. IV 20g placed to right AC, labs drawn. Pending MD assessment at this time.

## 2021-03-21 NOTE — CONSULT NOTE ADULT - SUBJECTIVE AND OBJECTIVE BOX
EDI ALBA  Patient is a 35y old  Female who presents with a chief complaint of   HPI:  This is a patient with ESRD due ADPKD on HD for 1 year. Missed HD for two sessions. Now has edema and dyspnea. No nausea or vomiting.  She was treated at [facility] but transferred to [facility]. On arrival, she did not treat and went home.      PAST MEDICAL & SURGICAL HISTORY:  ESRD (end stage renal disease) on dialysis    OCD (obsessive compulsive disorder)    Attention deficit hyperactivity disorder (ADHD)    Polycystic kidney disease    H/O 3 abortions    History of fracture of tibia  20 years ago- Left leg      MEDICATIONS  (STANDING):  Cinacalcet 30 mg PO daily.  Midrodrine 10 mg PO 1 hour pre HD.    Allergies    No Known Allergies    Intolerances      FAMILY HISTORY:  Family history of polycystic kidney        REVIEW OF SYSTEMS    General:  No fever, chills or night sweats.    Ophthalmologic: No changes in vision.  	  ENMT: No difficulty swallowing. 	    Respiratory and Thorax: No cough, wheezes or dyspnea.  	  Cardiovascular: No chest pains, tiredness or palpitations.	    Gastrointestinal: No dyspepsia, constipation or diarrhea.	    Genitourinary:	 No dysuria, hematuria or frequency.    Musculoskeletal: No joint pains or swelling. No muscle pains.    Neurological:	No weakness or numbness. No seizures.    Hematology/Lymphatics: No heat or cold intolerance.    Endocrine: No polyuria or polydipsia.	      Vital Signs Last 24 Hrs  T(C): 36.6 (21 Mar 2021 15:16), Max: 36.8 (21 Mar 2021 11:57)  T(F): 97.8 (21 Mar 2021 15:16), Max: 98.2 (21 Mar 2021 11:57)  HR: 77 (21 Mar 2021 15:16) (77 - 95)  BP: 110/63 (21 Mar 2021 15:16) (104/62 - 110/63)  BP(mean): --  RR: 18 (21 Mar 2021 15:16) (18 - 20)  SpO2: 100% (21 Mar 2021 15:16) (100% - 100%)    PHYSICAL EXAMINATION:  Constitutional: She  appears comfortable and not distressed. Not diaphoretic.    Neck:  The thyroid is normal. Trachea is midline.     Respiratory: The lungs are clear to auscultation. No dullness and expansion is normal.    Cardiovascular: S1 and S2 are normal. No mummurs, rubs or gallops are present.    Gastrointestinal: The abdomen is soft. No tenderness is present. No masses are present. Bowel sounds are normal.    Genitourinary: The bladder is not distended. No CVA tenderness is present.    Extremities: No edema is noted. No deformities are present. Fistula is patient with normal bruit and thrill.    Neurological: Cognition is normal. Tone, power and sensation are normal.     Skin: No lesions are seen  or palpated.    Lymph Nodes: No lymphadenopathy is present.    Psychiatric: Mood is appropriate. No hallucinations or flight of ideas are noted.                            9.4    6.50  )-----------( 250      ( 21 Mar 2021 13:13 )             29.9     03-21    143  |  102  |  54<H>  ----------------------------<  86  3.5   |  24  |  16.66<H>    Ca    9.2      21 Mar 2021 13:13    TPro  6.9  /  Alb  4.1  /  TBili  0.3  /  DBili  x   /  AST  10  /  ALT  6<L>  /  AlkPhos  53  03-21    LIVER FUNCTIONS - ( 21 Mar 2021 13:13 )  Alb: 4.1 g/dL / Pro: 6.9 g/dL / ALK PHOS: 53 U/L / ALT: 6 U/L / AST: 10 U/L / GGT: x

## 2021-03-21 NOTE — CONSULT NOTE ADULT - ASSESSMENT
ESRD: On HD via A-V fistula. Last treatment on Tuesday. Has edema but clear lungs.  Was at [facility] but transfered to  [place].     - HD tomorrow.  - UF to 2 L if tolerated.  - Midrodrine pre HD.  -  to place at HD Center since she does not have Center at present.    Anemia:  - EPO at HD.

## 2021-03-21 NOTE — H&P ADULT - NSHPPHYSICALEXAM_GEN_ALL_CORE
Vital Signs Last 24 Hrs  T(C): 36.6 (21 Mar 2021 15:16), Max: 36.8 (21 Mar 2021 11:57)  T(F): 97.8 (21 Mar 2021 15:16), Max: 98.2 (21 Mar 2021 11:57)  HR: 77 (21 Mar 2021 15:16) (77 - 95)  BP: 110/63 (21 Mar 2021 15:16) (104/62 - 110/63)  BP(mean): --  RR: 18 (21 Mar 2021 15:16) (18 - 20)  SpO2: 100% (21 Mar 2021 15:16) (100% - 100%)

## 2021-03-21 NOTE — H&P ADULT - NSICDXPASTMEDICALHX_GEN_ALL_CORE_FT
PAST MEDICAL HISTORY:  Attention deficit hyperactivity disorder (ADHD)     ESRD (end stage renal disease) on dialysis     OCD (obsessive compulsive disorder)     Polycystic kidney disease

## 2021-03-21 NOTE — H&P ADULT - PROBLEM SELECTOR PLAN 1
Patient with ADPCKD on HD last HD on Tues 3/16 due to discomfort with current HD center  - currently peripheral edema but saturating well on RA   - lytes acceptable  - phoslo with meals  - plan for HD tomorrow as per renal  - SW consult for outpt HD arrangement
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
## 2021-03-21 NOTE — ED PROVIDER NOTE - OBJECTIVE STATEMENT
34 y/o F PMH PCKD on HD t/th/sat presents to ED after missed 2 HD sessions this week. Last session was 5 days ago tuesday, reports she got the full session. reports feeling overloaded in her legs, denies f/c, sob, cough, n/v/d. makes scant urine. has not been drinking very much fluid due to feeling overloaded. pt recently transferred centers and states does not like new center and so did not want HD there.    Nephro: Dr. Martinez [place]   PCP: Shelly Shelton

## 2021-03-22 DIAGNOSIS — F42.9 OBSESSIVE-COMPULSIVE DISORDER, UNSPECIFIED: ICD-10-CM

## 2021-03-22 DIAGNOSIS — F43.10 POST-TRAUMATIC STRESS DISORDER, UNSPECIFIED: ICD-10-CM

## 2021-03-22 LAB
COVID-19 SPIKE DOMAIN AB INTERP: POSITIVE
COVID-19 SPIKE DOMAIN ANTIBODY RESULT: 101 U/ML — HIGH
HAV IGM SER-ACNC: SIGNIFICANT CHANGE UP
HBV CORE IGM SER-ACNC: SIGNIFICANT CHANGE UP
HBV SURFACE AB SER-ACNC: REACTIVE
HBV SURFACE AG SER-ACNC: SIGNIFICANT CHANGE UP
HBV SURFACE AG SER-ACNC: SIGNIFICANT CHANGE UP
HCV AB S/CO SERPL IA: 0.2 S/CO — SIGNIFICANT CHANGE UP (ref 0–0.99)
HCV AB S/CO SERPL IA: 0.2 S/CO — SIGNIFICANT CHANGE UP (ref 0–0.99)
HCV AB SERPL-IMP: SIGNIFICANT CHANGE UP
HCV AB SERPL-IMP: SIGNIFICANT CHANGE UP
SARS-COV-2 IGG+IGM SERPL QL IA: 101 U/ML — HIGH
SARS-COV-2 IGG+IGM SERPL QL IA: POSITIVE

## 2021-03-22 PROCEDURE — 90792 PSYCH DIAG EVAL W/MED SRVCS: CPT

## 2021-03-22 PROCEDURE — 99232 SBSQ HOSP IP/OBS MODERATE 35: CPT

## 2021-03-22 RX ORDER — LIDOCAINE AND PRILOCAINE CREAM 25; 25 MG/G; MG/G
1 CREAM TOPICAL
Refills: 0 | Status: DISCONTINUED | OUTPATIENT
Start: 2021-03-22 | End: 2021-03-26

## 2021-03-22 RX ADMIN — Medication 667 MILLIGRAM(S): at 08:09

## 2021-03-22 RX ADMIN — LIDOCAINE AND PRILOCAINE CREAM 1 APPLICATION(S): 25; 25 CREAM TOPICAL at 17:30

## 2021-03-22 RX ADMIN — MIDODRINE HYDROCHLORIDE 5 MILLIGRAM(S): 2.5 TABLET ORAL at 12:57

## 2021-03-22 RX ADMIN — HEPARIN SODIUM 5000 UNIT(S): 5000 INJECTION INTRAVENOUS; SUBCUTANEOUS at 06:08
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
## 2021-03-22 NOTE — BEHAVIORAL HEALTH ASSESSMENT NOTE - NSBHCHARTREVIEWVS_PSY_A_CORE FT
Vital Signs Last 24 Hrs  T(C): 36.7 (22 Mar 2021 12:05), Max: 37.1 (21 Mar 2021 19:40)  T(F): 98.1 (22 Mar 2021 12:05), Max: 98.7 (21 Mar 2021 19:40)  HR: 73 (22 Mar 2021 12:05) (73 - 77)  BP: 111/73 (22 Mar 2021 12:05) (99/60 - 111/73)  BP(mean): --  RR: 17 (22 Mar 2021 12:05) (17 - 18)  SpO2: 100% (22 Mar 2021 12:05) (99% - 100%)
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
## 2021-03-22 NOTE — BEHAVIORAL HEALTH ASSESSMENT NOTE - NSBHCHARTREVIEWLAB_PSY_A_CORE FT
9.4    6.50  )-----------( 250      ( 21 Mar 2021 13:13 )             29.9   03-21    143  |  102  |  54<H>  ----------------------------<  86  3.5   |  24  |  16.66<H>    Ca    9.2      21 Mar 2021 13:13    TPro  6.9  /  Alb  4.1  /  TBili  0.3  /  DBili  x   /  AST  10  /  ALT  6<L>  /  AlkPhos  53  03-21

## 2021-03-22 NOTE — BEHAVIORAL HEALTH ASSESSMENT NOTE - ACTIVATING EVENTS/STRESSORS
Triggering events leading to humiliation, shame, and/or despair (e.g. Loss of relationship, financial or health status) (real or anticipated)/Change in provider or treatment (i.e., medications, psychotherapy, milieu)/Acute medical problem/Hopeless about or dissatisfied with provider or treatment

## 2021-03-22 NOTE — BEHAVIORAL HEALTH ASSESSMENT NOTE - SUICIDE PROTECTIVE FACTORS
Responsibility to family and others/Identifies reasons for living/Has future plans/Supportive social network of family or friends/Ability to cope with stress

## 2021-03-22 NOTE — BEHAVIORAL HEALTH ASSESSMENT NOTE - NSBHCONSULTRECOMMENDOTHER_PSY_A_CORE FT
Patient amenable to outpatient therapy/psychiatry follow up  Patient counseled on communication techniques, coping skills, and conflict resolution given her current complex medical history with interpersonal conflict with treatment staff
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
## 2021-03-22 NOTE — PROGRESS NOTE ADULT - ASSESSMENT
ESRD on HD   via A-V fistula  Last HD treatment on Tuesday  Pt Was at [facility] w/ Dr. Martinez but transfered to [facility]  Pt did not receive treatment there as she was not happy with the center due to distance/ her treatment. Pt would like to stay at [facility] or transfer to [facility] near her home.   Please have social work see pt to coordinate her HD placement as outpt   HD planned for today   UF to 2 L if tolerated.  Midrodrine pre HD.  EMLA prior to HD    Anemia:  Hb low  EPO at HD. ESRD on HD   via A-V fistula  Last HD treatment on Tuesday  Pt Was at [facility] w/ Dr. Martinez but transfered to [place]  Pt did not receive treatment there as she was not happy with the center due to distance/ her treatment. Pt would like to stay at [facility] or transfer to [facility] near her home.   Please have social work see pt to coordinate her HD placement as outpt   Recommend psych evaluation  HD planned for today   UF to 2 L if tolerated.  Midrodrine pre HD.  EMLA prior to HD    Anemia:  Hb low  EPO at HD.
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
## 2021-03-22 NOTE — PROGRESS NOTE ADULT - ASSESSMENT
35F with PMH of ADPCKD on HD via LUE AVF and OCD who comes in for HD and in need of changing HD center
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
## 2021-03-23 PROCEDURE — 99232 SBSQ HOSP IP/OBS MODERATE 35: CPT

## 2021-03-23 PROCEDURE — 71045 X-RAY EXAM CHEST 1 VIEW: CPT | Mod: 26

## 2021-03-23 RX ADMIN — Medication 667 MILLIGRAM(S): at 14:04

## 2021-03-23 RX ADMIN — Medication 667 MILLIGRAM(S): at 10:03

## 2021-03-23 RX ADMIN — Medication 667 MILLIGRAM(S): at 17:29

## 2021-03-23 NOTE — PROGRESS NOTE ADULT - ASSESSMENT
ESRD on HD   via A-V fistula  Last HD treatment on 3/22   Plan for HD Wednesday   Please have social work see pt to coordinate her HD placement as outpt   psych evaluation noted  Midrodrine pre HD.  EMLA prior to HD    Anemia:  Hb low  EPO at HD.

## 2021-03-24 ENCOUNTER — TRANSCRIPTION ENCOUNTER (OUTPATIENT)
Age: 36
End: 2021-03-24

## 2021-03-24 LAB
ANION GAP SERPL CALC-SCNC: 25 MMOL/L — HIGH (ref 5–17)
BUN SERPL-MCNC: 45 MG/DL — HIGH (ref 7–23)
CALCIUM SERPL-MCNC: 9.9 MG/DL — SIGNIFICANT CHANGE UP (ref 8.4–10.5)
CHLORIDE SERPL-SCNC: 92 MMOL/L — LOW (ref 96–108)
CO2 SERPL-SCNC: 20 MMOL/L — LOW (ref 22–31)
CREAT SERPL-MCNC: 14.68 MG/DL — HIGH (ref 0.5–1.3)
GLUCOSE SERPL-MCNC: 63 MG/DL — LOW (ref 70–99)
HCT VFR BLD CALC: 32.6 % — LOW (ref 34.5–45)
HGB BLD-MCNC: 10.7 G/DL — LOW (ref 11.5–15.5)
MAGNESIUM SERPL-MCNC: 2.8 MG/DL — HIGH (ref 1.6–2.6)
MCHC RBC-ENTMCNC: 31.3 PG — SIGNIFICANT CHANGE UP (ref 27–34)
MCHC RBC-ENTMCNC: 32.8 GM/DL — SIGNIFICANT CHANGE UP (ref 32–36)
MCV RBC AUTO: 95.3 FL — SIGNIFICANT CHANGE UP (ref 80–100)
NRBC # BLD: 0 /100 WBCS — SIGNIFICANT CHANGE UP (ref 0–0)
PLATELET # BLD AUTO: 251 K/UL — SIGNIFICANT CHANGE UP (ref 150–400)
POTASSIUM SERPL-MCNC: 4.1 MMOL/L — SIGNIFICANT CHANGE UP (ref 3.5–5.3)
POTASSIUM SERPL-SCNC: 4.1 MMOL/L — SIGNIFICANT CHANGE UP (ref 3.5–5.3)
RBC # BLD: 3.42 M/UL — LOW (ref 3.8–5.2)
RBC # FLD: 13.6 % — SIGNIFICANT CHANGE UP (ref 10.3–14.5)
SODIUM SERPL-SCNC: 137 MMOL/L — SIGNIFICANT CHANGE UP (ref 135–145)
WBC # BLD: 7.25 K/UL — SIGNIFICANT CHANGE UP (ref 3.8–10.5)
WBC # FLD AUTO: 7.25 K/UL — SIGNIFICANT CHANGE UP (ref 3.8–10.5)

## 2021-03-24 PROCEDURE — 99232 SBSQ HOSP IP/OBS MODERATE 35: CPT

## 2021-03-24 RX ORDER — LANOLIN ALCOHOL/MO/W.PET/CERES
5 CREAM (GRAM) TOPICAL AT BEDTIME
Refills: 0 | Status: COMPLETED | OUTPATIENT
Start: 2021-03-24 | End: 2021-03-24

## 2021-03-24 RX ORDER — ERYTHROPOIETIN 10000 [IU]/ML
4000 INJECTION, SOLUTION INTRAVENOUS; SUBCUTANEOUS ONCE
Refills: 0 | Status: COMPLETED | OUTPATIENT
Start: 2021-03-24 | End: 2021-03-24

## 2021-03-24 RX ADMIN — Medication 667 MILLIGRAM(S): at 08:12

## 2021-03-24 RX ADMIN — LIDOCAINE AND PRILOCAINE CREAM 1 APPLICATION(S): 25; 25 CREAM TOPICAL at 13:11

## 2021-03-24 RX ADMIN — Medication 667 MILLIGRAM(S): at 11:58

## 2021-03-24 RX ADMIN — MIDODRINE HYDROCHLORIDE 5 MILLIGRAM(S): 2.5 TABLET ORAL at 13:11

## 2021-03-24 RX ADMIN — ERYTHROPOIETIN 4000 UNIT(S): 10000 INJECTION, SOLUTION INTRAVENOUS; SUBCUTANEOUS at 14:12

## 2021-03-24 RX ADMIN — Medication 5 MILLIGRAM(S): at 22:20

## 2021-03-24 NOTE — PROGRESS NOTE ADULT - ASSESSMENT
ESRD on HD   via A-V fistula  Last HD treatment on 3/22   Plan for HD today  social work actively coordinating her HD placement as outpt   psych evaluation noted. Recommend daily psych visits   Midodrine pre HD.  EMLA prior to HD    Anemia:  Hb low  EPO at HD.

## 2021-03-24 NOTE — DISCHARGE NOTE PROVIDER - CARE PROVIDER_API CALL
Shelly Shelton (DO)  Medicine  Gen Intrnl Medicine  [address], [address]rd Floor  [place], [place] [address]  Phone: [phone]  Fax: [phone]  Follow Up Time:    Shelly Shelton (DO)  Medicine  Gen Intrnl Medicine  [address], [address]rd Floor  [place], [place] [id number]  Phone: [phone]  Fax: [phone]  Follow Up Time:     Jasmin Grace (DO)  Nephrology  [address]nd [place] [address]  Phone: [phone]  Fax: [phone]  Follow Up Time:     Ana Cristina Xie)  Addiction Psychiatry; [facility]; Psychiatry  [address] [place] [address]  Phone: [phone]  Fax: [phone]  Follow Up Time:
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
## 2021-03-24 NOTE — DISCHARGE NOTE PROVIDER - NSDCMRMEDTOKEN_GEN_ALL_CORE_FT
midodrine 5 mg oral tablet: 1 tab(s) orally once a day, As Needed on HD days  PhosLo 667 mg oral tablet: 1 tab(s) orally 3 times a day   calcium acetate 667 mg oral tablet: 1 tab(s) orally 4 times a day   midodrine 5 mg oral tablet: 1 tab(s) orally once a day, As Needed -BEFORE HD ON HD DAYS

## 2021-03-24 NOTE — DISCHARGE NOTE PROVIDER - CARE PROVIDERS DIRECT ADDRESSES
[email or web address],DirectAddress_Unknown

## 2021-03-24 NOTE — DISCHARGE NOTE PROVIDER - NSDCCPCAREPLAN_GEN_ALL_CORE_FT
PRINCIPAL DISCHARGE DIAGNOSIS  Diagnosis: Dialysis patient, noncompliant  Assessment and Plan of Treatment: Continue HD as scheduled. Follow up with nephrology      SECONDARY DISCHARGE DIAGNOSES  Diagnosis: ESRD (end stage renal disease) on dialysis  Assessment and Plan of Treatment: HD as scheduled   Avoid taking (NSAIDs) - (ex: Ibuprofen, Advil, Celebrex, Naprosyn)  Avoid taking any nephrotoxic agents (can harm kidneys) - Intravenous contrast for diagnostic testing, combination cold medications.  Have all medications adjusted for your renal function by your Health Care Provider.  Blood pressure control is important.  Take all medication as prescribed.      Diagnosis: PTSD (post-traumatic stress disorder)  Assessment and Plan of Treatment: Continue current medications ,follow up with psychiatry for managment    Diagnosis: Obsessive-compulsive disorder, unspecified type  Assessment and Plan of Treatment: Continue current medications ,follow up with psychiatry for managment     PRINCIPAL DISCHARGE DIAGNOSIS  Diagnosis: Dialysis patient, noncompliant  Assessment and Plan of Treatment: Continue HD as scheduled. Follow up with nephrology      SECONDARY DISCHARGE DIAGNOSES  Diagnosis: Hypotension  Assessment and Plan of Treatment: Continue midodrine before HD. Follow up with PMD and nephrology    Diagnosis: ESRD (end stage renal disease) on dialysis  Assessment and Plan of Treatment: HD as scheduled   Avoid taking (NSAIDs) - (ex: Ibuprofen, Advil, Celebrex, Naprosyn)  Avoid taking any nephrotoxic agents (can harm kidneys) - Intravenous contrast for diagnostic testing, combination cold medications.  Have all medications adjusted for your renal function by your Health Care Provider.  Blood pressure control is important.  Take all medication as prescribed.      Diagnosis: PTSD (post-traumatic stress disorder)  Assessment and Plan of Treatment: Continue current medications ,follow up with psychiatry for managment    Diagnosis: Obsessive-compulsive disorder, unspecified type  Assessment and Plan of Treatment: Continue current medications ,follow up with psychiatry for managment     PRINCIPAL DISCHARGE DIAGNOSIS  Diagnosis: Dialysis patient, noncompliant  Assessment and Plan of Treatment: Continue HD as scheduled. Follow up with nephrology. Next session 3/27/21      SECONDARY DISCHARGE DIAGNOSES  Diagnosis: Hypotension  Assessment and Plan of Treatment: Continue midodrine before HD. Follow up with PMD and nephrology    Diagnosis: ESRD (end stage renal disease) on dialysis  Assessment and Plan of Treatment: HD as scheduled   Avoid taking (NSAIDs) - (ex: Ibuprofen, Advil, Celebrex, Naprosyn)  Avoid taking any nephrotoxic agents (can harm kidneys) - Intravenous contrast for diagnostic testing, combination cold medications.  Have all medications adjusted for your renal function by your Health Care Provider.  Blood pressure control is important.  Take all medication as prescribed.      Diagnosis: PTSD (post-traumatic stress disorder)  Assessment and Plan of Treatment: Continue current medications ,follow up with psychiatry for managment    Diagnosis: Obsessive-compulsive disorder, unspecified type  Assessment and Plan of Treatment: Continue current medications ,follow up with psychiatry for managment

## 2021-03-24 NOTE — CHART NOTE - NSCHARTNOTEFT_GEN_A_CORE
-Called by RN to state that pt. wants to leave hosp. Against Medical Advice  -Chart reviewed, and per CM's note today, referrals were made to several HD centers, and awaiting replies. As of today, there was no dialysis units offering slots at this time.   CM made pt. aware of above.  -Pt seen at bedside, and lengthy conversation took place between us. Pt was crying, upset, anxious, and at first was resistant to staying in hosp. pending outpt. dialysis set up post d/c  -Provider discussed with pt. the benefit of having an established outpt HD placement set up to maintain her health and well being, in light  of her CKD  -Pt offered an anxiolytic, but she declined, but agreed to Melatonin to help her sleep. Med ordered  -She agreed to remain in hospital tonight, and further discussions to take place in AM with day team, regarding updates on outpt. HD facilities  -D/W primary RN and PM ANM on 6Monti

## 2021-03-24 NOTE — DISCHARGE NOTE PROVIDER - NSDCFUSCHEDAPPT_GEN_ALL_CORE_FT
[facility] ; 04/02/2021 ; [facility] [address]  [facility] ; 05/25/2021 ; [facility] [address] [facility] ; 05/25/2021 ; [facility] [address]

## 2021-03-24 NOTE — DISCHARGE NOTE PROVIDER - NSDCFUADDAPPT_GEN_ALL_CORE_FT
Please follow up with PMD, renal, psych in 1- 2 weeks.   [facility]

## 2021-03-24 NOTE — CHART NOTE - NSCHARTNOTEFT_GEN_A_CORE
Called to evaluate patient for hypotension s/p HD today. Pt asymptomatic. No complaints. NO intervention. Continue to assess

## 2021-03-24 NOTE — DISCHARGE NOTE PROVIDER - PROVIDER TOKENS
PROVIDER:[TOKEN:[[id number]]] PROVIDER:[TOKEN:[[id number]]],PROVIDER:[TOKEN:[[id number]]],PROVIDER:[TOKEN:[[id number]]]

## 2021-03-25 LAB
ANION GAP SERPL CALC-SCNC: 22 MMOL/L — HIGH (ref 5–17)
BUN SERPL-MCNC: 25 MG/DL — HIGH (ref 7–23)
CALCIUM SERPL-MCNC: 10.4 MG/DL — SIGNIFICANT CHANGE UP (ref 8.4–10.5)
CHLORIDE SERPL-SCNC: 89 MMOL/L — LOW (ref 96–108)
CO2 SERPL-SCNC: 24 MMOL/L — SIGNIFICANT CHANGE UP (ref 22–31)
CREAT SERPL-MCNC: 9.75 MG/DL — HIGH (ref 0.5–1.3)
GLUCOSE SERPL-MCNC: 68 MG/DL — LOW (ref 70–99)
HCT VFR BLD CALC: 39.6 % — SIGNIFICANT CHANGE UP (ref 34.5–45)
HGB BLD-MCNC: 12.6 G/DL — SIGNIFICANT CHANGE UP (ref 11.5–15.5)
MCHC RBC-ENTMCNC: 30.7 PG — SIGNIFICANT CHANGE UP (ref 27–34)
MCHC RBC-ENTMCNC: 31.8 GM/DL — LOW (ref 32–36)
MCV RBC AUTO: 96.4 FL — SIGNIFICANT CHANGE UP (ref 80–100)
NRBC # BLD: 0 /100 WBCS — SIGNIFICANT CHANGE UP (ref 0–0)
PLATELET # BLD AUTO: 272 K/UL — SIGNIFICANT CHANGE UP (ref 150–400)
POTASSIUM SERPL-MCNC: 4.4 MMOL/L — SIGNIFICANT CHANGE UP (ref 3.5–5.3)
POTASSIUM SERPL-SCNC: 4.4 MMOL/L — SIGNIFICANT CHANGE UP (ref 3.5–5.3)
RBC # BLD: 4.11 M/UL — SIGNIFICANT CHANGE UP (ref 3.8–5.2)
RBC # FLD: 13.7 % — SIGNIFICANT CHANGE UP (ref 10.3–14.5)
SODIUM SERPL-SCNC: 135 MMOL/L — SIGNIFICANT CHANGE UP (ref 135–145)
WBC # BLD: 6.8 K/UL — SIGNIFICANT CHANGE UP (ref 3.8–10.5)
WBC # FLD AUTO: 6.8 K/UL — SIGNIFICANT CHANGE UP (ref 3.8–10.5)

## 2021-03-25 PROCEDURE — 99232 SBSQ HOSP IP/OBS MODERATE 35: CPT

## 2021-03-25 RX ADMIN — Medication 667 MILLIGRAM(S): at 09:05

## 2021-03-25 RX ADMIN — Medication 667 MILLIGRAM(S): at 19:40
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
## 2021-03-25 NOTE — PROVIDER CONTACT NOTE (OTHER) - ACTION/TREATMENT ORDERED:
Pt seen by CHRISTIANO Baeza. Pt agrees to stay overnight. Pt states she wants to be left alone. Recommending to leave pt and not disturb over night/ while sleeping.
Pt seen by ELMO Park. Pt agrees to stay overnight. Pt states she wants to be left alone. Recommending to leave pt and not disturb over night/ while sleeping.

## 2021-03-25 NOTE — PROGRESS NOTE ADULT - ASSESSMENT
ESRD on HD   via A-V fistula  Last HD treatment on 3/24  HD Friday   social work actively coordinating her HD placement as outpt   psych evaluation noted. Recommend daily psych visits   Midodrine pre HD.  EMLA prior to HD    Anemia:  Hb at goal   Hold epogen

## 2021-03-25 NOTE — CHART NOTE - NSCHARTNOTEFT_GEN_A_CORE
Discussed with patient regarding Zain & Zain COVID vaccine.   As per patient, she has already received 1st dose of Moderna in March 2021, and will receive 2nd dose on April 5, 2021. Thus, patient does not qualify for J&J vaccine      Maryann Osman PA-C  #[id number]

## 2021-03-26 ENCOUNTER — TRANSCRIPTION ENCOUNTER (OUTPATIENT)
Age: 36
End: 2021-03-26

## 2021-03-26 VITALS
TEMPERATURE: 98 F | RESPIRATION RATE: 18 BRPM | SYSTOLIC BLOOD PRESSURE: 92 MMHG | HEART RATE: 105 BPM | OXYGEN SATURATION: 97 % | DIASTOLIC BLOOD PRESSURE: 59 MMHG

## 2021-03-26 LAB
ANION GAP SERPL CALC-SCNC: 24 MMOL/L — HIGH (ref 5–17)
BUN SERPL-MCNC: 44 MG/DL — HIGH (ref 7–23)
CALCIUM SERPL-MCNC: 9.9 MG/DL — SIGNIFICANT CHANGE UP (ref 8.4–10.5)
CHLORIDE SERPL-SCNC: 91 MMOL/L — LOW (ref 96–108)
CO2 SERPL-SCNC: 21 MMOL/L — LOW (ref 22–31)
CREAT SERPL-MCNC: 12.71 MG/DL — HIGH (ref 0.5–1.3)
GLUCOSE BLDC GLUCOMTR-MCNC: 80 MG/DL — SIGNIFICANT CHANGE UP (ref 70–99)
GLUCOSE SERPL-MCNC: 62 MG/DL — LOW (ref 70–99)
POTASSIUM SERPL-MCNC: 4.3 MMOL/L — SIGNIFICANT CHANGE UP (ref 3.5–5.3)
POTASSIUM SERPL-SCNC: 4.3 MMOL/L — SIGNIFICANT CHANGE UP (ref 3.5–5.3)
SODIUM SERPL-SCNC: 136 MMOL/L — SIGNIFICANT CHANGE UP (ref 135–145)

## 2021-03-26 PROCEDURE — 80074 ACUTE HEPATITIS PANEL: CPT

## 2021-03-26 PROCEDURE — 99261: CPT

## 2021-03-26 PROCEDURE — 83735 ASSAY OF MAGNESIUM: CPT

## 2021-03-26 PROCEDURE — 99239 HOSP IP/OBS DSCHRG MGMT >30: CPT

## 2021-03-26 PROCEDURE — 71045 X-RAY EXAM CHEST 1 VIEW: CPT

## 2021-03-26 PROCEDURE — U0005: CPT

## 2021-03-26 PROCEDURE — U0003: CPT

## 2021-03-26 PROCEDURE — 86803 HEPATITIS C AB TEST: CPT

## 2021-03-26 PROCEDURE — 99232 SBSQ HOSP IP/OBS MODERATE 35: CPT

## 2021-03-26 PROCEDURE — 80053 COMPREHEN METABOLIC PANEL: CPT

## 2021-03-26 PROCEDURE — 85025 COMPLETE CBC W/AUTO DIFF WBC: CPT

## 2021-03-26 PROCEDURE — 85027 COMPLETE CBC AUTOMATED: CPT

## 2021-03-26 PROCEDURE — 80048 BASIC METABOLIC PNL TOTAL CA: CPT

## 2021-03-26 PROCEDURE — 99285 EMERGENCY DEPT VISIT HI MDM: CPT

## 2021-03-26 PROCEDURE — 82962 GLUCOSE BLOOD TEST: CPT

## 2021-03-26 PROCEDURE — 87340 HEPATITIS B SURFACE AG IA: CPT

## 2021-03-26 PROCEDURE — 86706 HEP B SURFACE ANTIBODY: CPT

## 2021-03-26 PROCEDURE — 86769 SARS-COV-2 COVID-19 ANTIBODY: CPT

## 2021-03-26 RX ORDER — CALCIUM ACETATE 667 MG
1 TABLET ORAL
Qty: 120 | Refills: 0
Start: 2021-03-26 | End: 2021-04-24

## 2021-03-26 RX ORDER — CALCIUM ACETATE 667 MG
1 TABLET ORAL
Qty: 0 | Refills: 0 | DISCHARGE

## 2021-03-26 RX ORDER — MIDODRINE HYDROCHLORIDE 2.5 MG/1
1 TABLET ORAL
Qty: 30 | Refills: 0
Start: 2021-03-26 | End: 2021-04-24

## 2021-03-26 RX ORDER — MIDODRINE HYDROCHLORIDE 2.5 MG/1
1 TABLET ORAL
Qty: 0 | Refills: 0 | DISCHARGE

## 2021-03-26 RX ADMIN — LIDOCAINE AND PRILOCAINE CREAM 1 APPLICATION(S): 25; 25 CREAM TOPICAL at 08:25

## 2021-03-26 RX ADMIN — Medication 667 MILLIGRAM(S): at 10:15

## 2021-03-26 RX ADMIN — MIDODRINE HYDROCHLORIDE 5 MILLIGRAM(S): 2.5 TABLET ORAL at 08:10

## 2021-03-26 NOTE — DISCHARGE NOTE NURSING/CASE MANAGEMENT/SOCIAL WORK - PATIENT PORTAL LINK FT
You can access the FollowMyHealth Patient Portal offered by [facility] by registering at the following website: [email or web address]. By joining [facility]’s FollowMyHealth portal, you will also be able to view your health information using other applications (apps) compatible with our system.
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
## 2021-03-26 NOTE — PROGRESS NOTE BEHAVIORAL HEALTH - NSBHFUPINTERVALCCFT_PSY_A_CORE
"I think this new dialysis center will work out. "
"I was accepted back at my old dialysis center and it is close to home. "
"I'm hoping the new center will work out. "

## 2021-03-26 NOTE — PROGRESS NOTE BEHAVIORAL HEALTH - NSBHCHARTREVIEWVS_PSY_A_CORE FT
Vital Signs Last 24 Hrs  T(C): 36.7 (23 Mar 2021 13:00), Max: 37 (22 Mar 2021 17:36)  T(F): 98 (23 Mar 2021 13:00), Max: 98.6 (22 Mar 2021 17:36)  HR: 106 (23 Mar 2021 13:00) (81 - 106)  BP: 97/59 (23 Mar 2021 13:00) (97/59 - 103/61)  BP(mean): --  RR: 18 (23 Mar 2021 13:00) (18 - 20)  SpO2: 97% (23 Mar 2021 13:00) (97% - 99%)
Vital Signs Last 24 Hrs  T(C): 36.7 (26 Mar 2021 12:43), Max: 37.2 (25 Mar 2021 21:29)  T(F): 98.1 (26 Mar 2021 12:43), Max: 99 (25 Mar 2021 21:29)  HR: 105 (26 Mar 2021 12:43) (89 - 105)  BP: 92/59 (26 Mar 2021 12:43) (86/49 - 103/70)  BP(mean): --  RR: 18 (26 Mar 2021 12:43) (18 - 18)  SpO2: 97% (26 Mar 2021 12:43) (97% - 100%)
Vital Signs Last 24 Hrs  T(C): 36.8 (24 Mar 2021 13:33), Max: 36.9 (24 Mar 2021 06:09)  T(F): 98.3 (24 Mar 2021 13:33), Max: 98.4 (24 Mar 2021 06:09)  HR: 88 (24 Mar 2021 13:33) (88 - 100)  BP: 101/57 (24 Mar 2021 13:33) (96/60 - 101/57)  BP(mean): --  RR: 17 (24 Mar 2021 13:33) (17 - 18)  SpO2: 99% (24 Mar 2021 13:33) (98% - 99%)

## 2021-03-26 NOTE — PROGRESS NOTE ADULT - PROBLEM SELECTOR PLAN 4
DVT: heparin SQ  Diet: Renal diet  Ambulate as tolerated.    Dispo: Pending outpatient HD set up. Is medically ready for d/c home pending seat availability
DVT: heparin SQ  Diet: Renal diet  Ambulate as tolerated.    Dispo: Discharge today. d/c time spent 31 minutes
DVT: heparin SQ  Diet: Renal diet  Ambulate as tolerated.    Dispo: Pending outpatient HD set up. Is medically ready for d/c home pending seat availability

## 2021-03-26 NOTE — PROGRESS NOTE ADULT - PROBLEM SELECTOR PLAN 2
Hb 9s no overt bleeding  - EPO as per Nephro

## 2021-03-26 NOTE — PROGRESS NOTE BEHAVIORAL HEALTH - NSBHCONSULTFOLLOWAFTERCARE_PSY_A_CORE FT
she is considering going to see Dr Sergio Bruno who does accept Medicare which she has.  She plans to request an appointment on his website.  Pt also has the number for [facility] walk in urgent care psychiatric clinic [phone]
Follow up with outpatient therapy
Follow up with outpatient therapy

## 2021-03-26 NOTE — PROGRESS NOTE ADULT - PROVIDER SPECIALTY LIST ADULT
Nephrology
Internal Medicine
Nephrology
Internal Medicine

## 2021-03-26 NOTE — PROGRESS NOTE ADULT - SUBJECTIVE AND OBJECTIVE BOX
[facility] [place]   MD Gary Rincon MD, D.O.  ELMO Richards    From 7 AM - 5 PM:  OFFICE: [phone]  Dr. Quijano cell: [phone]  Dr. Brooks cell: [phone]  Dr. Grace cell: [phone]  ELMO Juan cell: [phone]    From 5 PM - 7 AM: Answering Service: [phone]  Date of service: 03-24-21 @ 13:02    RENAL FOLLOW UP NOTE  --------------------------------------------------------------------------------  HPI:  Pt seen at bedside. She was yelling aggressively stating she would like to leave to go home today. She was politely explained that her dialysis unit must be set up in order for her to be safely discharged. She was angry and threatening me that she would go to administration if there was any further delay in her placement. I explained that this is process that requires several steps including acceptance, placement and insurance acceptances. Pt was not expressing understanding, remained angry. She remained yelling.  I politely stated that I would do what I can to expedite the process and allow for a safe discharge     I personally spoke to the socially worker/  on the case.     PAST HISTORY  --------------------------------------------------------------------------------  No significant changes to PMH, PSH, FHx, SHx, unless otherwise noted    ALLERGIES & MEDICATIONS  --------------------------------------------------------------------------------  Allergies    No Known Allergies    Intolerances      Standing Inpatient Medications  calcium acetate 667 milliGRAM(s) Oral four times a day with meals  heparin   Injectable 5000 Unit(s) SubCutaneous every 12 hours  lidocaine/prilocaine Cream 1 Application(s) Topical <User Schedule>    PRN Inpatient Medications  midodrine 5 milliGRAM(s) Oral <User Schedule> PRN      REVIEW OF SYSTEMS  --------------------------------------------------------------------------------  General: no fever  CVS: no chest pain  RESP: no sob, no cough  ABD: no abdominal pain  : no dysuria  MSK: no edema     VITALS/PHYSICAL EXAM  --------------------------------------------------------------------------------  T(C): 36.9 (03-24-21 @ 06:09), Max: 36.9 (03-24-21 @ 06:09)  HR: 100 (03-24-21 @ 06:09) (100 - 100)  BP: 97/66 (03-24-21 @ 06:09) (96/60 - 97/66)  RR: 18 (03-24-21 @ 06:09) (18 - 18)  SpO2: 98% (03-24-21 @ 06:09) (98% - 98%)  Wt(kg): --        03-24-21 @ 07:01  -  03-24-21 @ 13:02  --------------------------------------------------------  IN: 480 mL / OUT: 0 mL / NET: 480 mL      Physical Exam:  Deferred due to inappropriate pt behavior. I felt unsafe doing any physical exam as pt was yelling and aggressively speaking to me     LABS/STUDIES  --------------------------------------------------------------------------------    Creatinine Trend:  SCr 16.66 [03-21 @ 13:13]    Iron 73, TIBC 237, %sat 31      [09-02-20 @ 10:14]  Ferritin 60      [09-02-20 @ 10:20]  PTH -- (Ca 8.4)      [09-02-20 @ 10:20]   1011  Vitamin D (25OH) 16.4      [09-02-20 @ 10:20]  TSH 3.19      [11-02-20 @ 11:26]    HBsAb Reactive      [03-22-21 @ 08:18]  HBsAg Nonreact      [03-22-21 @ 08:18]  HCV 0.20, Nonreact      [03-22-21 @ 08:18]    
[facility] [place] [facility]   MD Gary Rincon MD, D.O. Ruoru Wong, PA    From 7 AM - 5 PM:  OFFICE: [phone]  Dr. Quijano cell: [phone]  Dr. Brooks cell: [phone]  Dr. Grace cell: [phone]  ELMO Juan cell: [phone]    From 5 PM - 7 AM: Answering Service: [phone]  Date of service: 03-25-21 @ 12:06    RENAL FOLLOW UP NOTE  --------------------------------------------------------------------------------  HPI:  Pt seen at bedside. She is upset that she has not had placement, would like to go home AMA.     PAST HISTORY  --------------------------------------------------------------------------------  No significant changes to PMH, PSH, FHx, SHx, unless otherwise noted    ALLERGIES & MEDICATIONS  --------------------------------------------------------------------------------  Allergies    No Known Allergies    Intolerances      Standing Inpatient Medications  calcium acetate 667 milliGRAM(s) Oral four times a day with meals  heparin   Injectable 5000 Unit(s) SubCutaneous every 12 hours  lidocaine/prilocaine Cream 1 Application(s) Topical <User Schedule>    PRN Inpatient Medications  midodrine 5 milliGRAM(s) Oral <User Schedule> PRN      REVIEW OF SYSTEMS  --------------------------------------------------------------------------------  General: no fever  CVS: no chest pain  RESP: no sob, no cough  ABD: no abdominal pain  : no dysuria  MSK: no edema     VITALS/PHYSICAL EXAM  --------------------------------------------------------------------------------  T(C): 37 (03-25-21 @ 05:58), Max: 37 (03-25-21 @ 05:58)  HR: 101 (03-25-21 @ 05:58) (85 - 101)  BP: 92/60 (03-25-21 @ 05:58) (84/58 - 101/57)  RR: 18 (03-25-21 @ 05:58) (16 - 18)  SpO2: 99% (03-25-21 @ 05:58) (99% - 99%)  Wt(kg): --        03-24-21 @ 07:01  -  03-25-21 @ 07:00  --------------------------------------------------------  IN: 605 mL / OUT: 1000 mL / NET: -395 mL    03-25-21 @ 07:01  -  03-25-21 @ 12:06  --------------------------------------------------------  IN: 360 mL / OUT: 0 mL / NET: 360 mL      Physical Exam:  deferred as pt is upset     LABS/STUDIES  --------------------------------------------------------------------------------              12.6   6.80  >-----------<  272      [03-25-21 @ 06:17]              39.6     135  |  89  |  25  ----------------------------<  68      [03-25-21 @ 06:17]  4.4   |  24  |  9.75        Ca     10.4     [03-25-21 @ 06:17]      Mg     2.8     [03-24-21 @ 15:14]    Creatinine Trend:  SCr 9.75 [03-25 @ 06:17]  SCr 14.68 [03-24 @ 15:14]  SCr 16.66 [03-21 @ 13:13]    Iron 73, TIBC 237, %sat 31      [09-02-20 @ 10:14]  Ferritin 60      [09-02-20 @ 10:20]  PTH -- (Ca 8.4)      [09-02-20 @ 10:20]   1011  Vitamin D (25OH) 16.4      [09-02-20 @ 10:20]  TSH 3.19      [11-02-20 @ 11:26]    HBsAb Reactive      [03-22-21 @ 08:18]  HBsAg Nonreact      [03-22-21 @ 08:18]  HCV 0.20, Nonreact      [03-22-21 @ 08:18]    
[facility] [place] [facility]   MD Gary Rincon MD, D.O. Ruoru Wong, PA    From 7 AM - 5 PM:  OFFICE: [phone]  Dr. Quijano cell: [phone]  Dr. Brooks cell: [phone]  Dr. Grace cell: [phone]  ELMO Juan cell: [phone]    From 5 PM - 7 AM: Answering Service: [phone]  Date of service: 03-22-21 @ 10:26    RENAL FOLLOW UP NOTE  --------------------------------------------------------------------------------  HPI:  Pt seen and examined at bedside.   Raviies SOB, chest pain     PAST HISTORY  --------------------------------------------------------------------------------  No significant changes to PMH, PSH, FHx, SHx, unless otherwise noted    ALLERGIES & MEDICATIONS  --------------------------------------------------------------------------------  Allergies    No Known Allergies    Intolerances      Standing Inpatient Medications  calcium acetate 667 milliGRAM(s) Oral four times a day with meals  heparin   Injectable 5000 Unit(s) SubCutaneous every 12 hours    PRN Inpatient Medications  midodrine 5 milliGRAM(s) Oral <User Schedule> PRN      REVIEW OF SYSTEMS  --------------------------------------------------------------------------------  General: no fever  CVS: no chest pain  RESP: no sob, no cough  ABD: no abdominal pain  : no dysuria  MSK: no edema     VITALS/PHYSICAL EXAM  --------------------------------------------------------------------------------  T(C): 36.7 (03-22-21 @ 05:38), Max: 37.1 (03-21-21 @ 19:40)  HR: 76 (03-22-21 @ 05:38) (74 - 95)  BP: 100/74 (03-22-21 @ 05:38) (99/60 - 110/63)  RR: 18 (03-22-21 @ 05:38) (18 - 20)  SpO2: 100% (03-22-21 @ 05:38) (99% - 100%)  Wt(kg): --  Height (cm): 149.9 (03-21-21 @ 11:57)  Weight (kg): 55.7 (03-21-21 @ 11:57)  BMI (kg/m2): 24.8 (03-21-21 @ 11:57)  BSA (m2): 1.5 (03-21-21 @ 11:57)      03-21-21 @ 07:01  -  03-22-21 @ 07:00  --------------------------------------------------------  IN: 250 mL / OUT: 90 mL / NET: 160 mL      Physical Exam:  	Gen: NAD  	HEENT: MMM  	Pulm: CTA B/L  	CV: S1S2  	Abd: Soft, +BS  	Ext: No LE edema B/L                      Neuro: Awake   	Skin: Warm and Dry   	Vascular access: avf, + thrill     LABS/STUDIES  --------------------------------------------------------------------------------              9.4    6.50  >-----------<  250      [03-21-21 @ 13:13]              29.9     143  |  102  |  54  ----------------------------<  86      [03-21-21 @ 13:13]  3.5   |  24  |  16.66        Ca     9.2     [03-21-21 @ 13:13]    TPro  6.9  /  Alb  4.1  /  TBili  0.3  /  DBili  x   /  AST  10  /  ALT  6   /  AlkPhos  53  [03-21-21 @ 13:13]    Creatinine Trend:  SCr 16.66 [03-21 @ 13:13]    Iron 73, TIBC 237, %sat 31      [09-02-20 @ 10:14]  Ferritin 60      [09-02-20 @ 10:20]  PTH -- (Ca 8.4)      [09-02-20 @ 10:20]   1011  Vitamin D (25OH) 16.4      [09-02-20 @ 10:20]  TSH 3.19      [11-02-20 @ 11:26]      
[facility] [place]   MD Gary Rincon MD, D.O. Ruoru Wong, PA    From 7 AM - 5 PM:  OFFICE: [phone]  Dr. Quijano cell: [phone]  Dr. Brooks cell: [phone]  Dr. Grace cell: [phone]  ELMO Juan cell: [phone]    From 5 PM - 7 AM: Answering Service: [phone]  Date of service: 03-23-21 @ 12:18    RENAL FOLLOW UP NOTE  --------------------------------------------------------------------------------  HPI:  Pt seen and examined at bedside.       PAST HISTORY  --------------------------------------------------------------------------------  No significant changes to PMH, PSH, FHx, SHx, unless otherwise noted    ALLERGIES & MEDICATIONS  --------------------------------------------------------------------------------  Allergies    No Known Allergies    Intolerances      Standing Inpatient Medications  calcium acetate 667 milliGRAM(s) Oral four times a day with meals  heparin   Injectable 5000 Unit(s) SubCutaneous every 12 hours  lidocaine/prilocaine Cream 1 Application(s) Topical <User Schedule>    PRN Inpatient Medications  midodrine 5 milliGRAM(s) Oral <User Schedule> PRN      REVIEW OF SYSTEMS  --------------------------------------------------------------------------------  General: no fever  CVS: no chest pain  RESP: no sob, no cough  ABD: no abdominal pain  : no dysuria,  MSK: no edema     VITALS/PHYSICAL EXAM  --------------------------------------------------------------------------------  T(C): 36.7 (03-22-21 @ 21:35), Max: 37 (03-22-21 @ 17:36)  HR: 84 (03-22-21 @ 21:35) (81 - 84)  BP: 100/60 (03-22-21 @ 21:35) (100/60 - 103/61)  RR: 18 (03-22-21 @ 21:35) (18 - 20)  SpO2: 98% (03-22-21 @ 21:35) (98% - 99%)  Wt(kg): --        03-22-21 @ 07:01  -  03-23-21 @ 07:00  --------------------------------------------------------  IN: 1160 mL / OUT: 2800 mL / NET: -1640 mL      Physical Exam:  	Gen: NAD  	HEENT: MMM  	Pulm: CTA B/L  	CV: S1S2  	Abd: Soft, +BS  	Ext: No LE edema B/L                      Neuro: Awake   	Skin: Warm and Dry   	Vascular access: +AVf    LABS/STUDIES  --------------------------------------------------------------------------------              9.4    6.50  >-----------<  250      [03-21-21 @ 13:13]              29.9     143  |  102  |  54  ----------------------------<  86      [03-21-21 @ 13:13]  3.5   |  24  |  16.66        Ca     9.2     [03-21-21 @ 13:13]    TPro  6.9  /  Alb  4.1  /  TBili  0.3  /  DBili  x   /  AST  10  /  ALT  6   /  AlkPhos  53  [03-21-21 @ 13:13]      Creatinine Trend:  SCr 16.66 [03-21 @ 13:13]    Iron 73, TIBC 237, %sat 31      [09-02-20 @ 10:14]  Ferritin 60      [09-02-20 @ 10:20]  PTH -- (Ca 8.4)      [09-02-20 @ 10:20]   1011  Vitamin D (25OH) 16.4      [09-02-20 @ 10:20]  TSH 3.19      [11-02-20 @ 11:26]    HBsAb Reactive      [03-22-21 @ 08:18]  HBsAg Nonreact      [03-22-21 @ 08:18]  HCV 0.20, Nonreact      [03-22-21 @ 08:18]    
Brent Johnson MD  Division of Hospital Medicine  Pager [phone]  If no response or off hours page: [phone]  ---------------------------------------------------------    EDI ALBA  35y  Female      Patient is a 35y old  Female who presents with a chief complaint of needs HD (24 Mar 2021 13:02)      INTERVAL HPI/OVERNIGHT EVENTS:  Seen at bedside. Wanting to leave AMA overnight as frustrated that dialysis seat has not yet been set up. Aware that in order to have safe discharge a dialysis seat needs to be set up      REVIEW OF SYSTEMS: 10 point ROS negative unless listed above    T(C): 37 (03-25-21 @ 05:58), Max: 37 (03-25-21 @ 05:58)  HR: 101 (03-25-21 @ 05:58) (85 - 101)  BP: 92/60 (03-25-21 @ 05:58) (84/58 - 101/57)  RR: 18 (03-25-21 @ 05:58) (16 - 18)  SpO2: 99% (03-25-21 @ 05:58) (99% - 99%)  Wt(kg): --Vital Signs Last 24 Hrs  T(C): 37 (25 Mar 2021 05:58), Max: 37 (25 Mar 2021 05:58)  T(F): 98.6 (25 Mar 2021 05:58), Max: 98.6 (25 Mar 2021 05:58)  HR: 101 (25 Mar 2021 05:58) (85 - 101)  BP: 92/60 (25 Mar 2021 05:58) (84/58 - 101/57)  BP(mean): --  RR: 18 (25 Mar 2021 05:58) (16 - 18)  SpO2: 99% (25 Mar 2021 05:58) (99% - 99%)    PHYSICAL EXAM:  GENERAL: NAD, well-groomed, well-developed  NECK: Supple, No JVD  CHEST/LUNG: Clear to auscultation bilaterally; No rales, rhonchi, wheezing, or rubs  HEART: Regular rate and rhythm; No murmurs, rubs, or gallops  ABDOMEN: Soft, Nontender, Nondistended; Bowel sounds present.    EXTREMITIES:  2+ Peripheral Pulses, No clubbing, cyanosis, NO LE edema. LUE AVF  PSYCH: Alert & Oriented x3  NERVOUS SYSTEM: Motor Strength 5/5 B/L upper and lower extremities.  Sensory intact    Consultant(s) Notes Reviewed:  [x ] YES  [ ] NO  Care Discussed with Consultants/Other Providers [ x] YES  [ ] NO    LABS:                        12.6   6.80  )-----------( 272      ( 25 Mar 2021 06:17 )             39.6     03-25    135  |  89<L>  |  25<H>  ----------------------------<  68<L>  4.4   |  24  |  9.75<H>    Ca    10.4      25 Mar 2021 06:17  Mg     2.8     03-24          CAPILLARY BLOOD GLUCOSE                RADIOLOGY & ADDITIONAL TESTS:    Imaging Personally Reviewed:  [x ] YES  [ ] NO
Brent Johnson MD  Division of Hospital Medicine  Pager [phone]  If no response or off hours page: [phone]  ---------------------------------------------------------    EDI ALBA  35y  Female      Patient is a 35y old  Female who presents with a chief complaint of needs HD (22 Mar 2021 10:26)      INTERVAL HPI/OVERNIGHT EVENTS:  Seen at bedside. Walking around unit. Has no complaints.       REVIEW OF SYSTEMS: 10 point ROS negative unless listed above    T(C): 36.7 (03-22-21 @ 05:38), Max: 37.1 (03-21-21 @ 19:40)  HR: 76 (03-22-21 @ 05:38) (74 - 95)  BP: 100/74 (03-22-21 @ 05:38) (99/60 - 110/63)  RR: 18 (03-22-21 @ 05:38) (18 - 20)  SpO2: 100% (03-22-21 @ 05:38) (99% - 100%)  Wt(kg): --Vital Signs Last 24 Hrs  T(C): 36.7 (22 Mar 2021 05:38), Max: 37.1 (21 Mar 2021 19:40)  T(F): 98 (22 Mar 2021 05:38), Max: 98.7 (21 Mar 2021 19:40)  HR: 76 (22 Mar 2021 05:38) (74 - 95)  BP: 100/74 (22 Mar 2021 05:38) (99/60 - 110/63)  BP(mean): --  RR: 18 (22 Mar 2021 05:38) (18 - 20)  SpO2: 100% (22 Mar 2021 05:38) (99% - 100%)    PHYSICAL EXAM:  GENERAL: NAD, well-groomed, well-developed  HEAD:  Atraumatic, Normocephalic  EYES: EOMI, PERRLA, conjunctiva and sclera clear  NECK: Supple, No JVD  CHEST/LUNG: Clear to auscultation bilaterally; No rales, rhonchi, wheezing, or rubs  HEART: Regular rate and rhythm; No murmurs, rubs, or gallops  ABDOMEN: Soft, Nontender, Nondistended; Bowel sounds present.    EXTREMITIES:  2+ Peripheral Pulses, No clubbing, cyanosis, 2+ LE edema. LUE AVF  PSYCH: Alert & Oriented x3  NERVOUS SYSTEM: Motor Strength 5/5 B/L upper and lower extremities.  Sensory intact    Consultant(s) Notes Reviewed:  [x ] YES  [ ] NO  Care Discussed with Consultants/Other Providers [ x] YES  [ ] NO    LABS:                        9.4    6.50  )-----------( 250      ( 21 Mar 2021 13:13 )             29.9     03-21    143  |  102  |  54<H>  ----------------------------<  86  3.5   |  24  |  16.66<H>    Ca    9.2      21 Mar 2021 13:13    TPro  6.9  /  Alb  4.1  /  TBili  0.3  /  DBili  x   /  AST  10  /  ALT  6<L>  /  AlkPhos  53  03-21        CAPILLARY BLOOD GLUCOSE                RADIOLOGY & ADDITIONAL TESTS:    Imaging Personally Reviewed:  [x ] YES  [ ] NO
Brent Johnson MD  Division of Hospital Medicine  Pager [phone]  If no response or off hours page: [phone]  ---------------------------------------------------------    EDI ALBA  35y  Female      Patient is a 35y old  Female who presents with a chief complaint of needs HD (22 Mar 2021 10:37)      INTERVAL HPI/OVERNIGHT EVENTS:  Seen at bedside. Feels "great." Had HD last night was agitated afterward requesting to leave ama. This am calm, comfortable. Amenable to plan of waiting for HD to be set up      REVIEW OF SYSTEMS: 10 point ROS negative unless listed above    T(C): 36.7 (03-22-21 @ 21:35), Max: 37 (03-22-21 @ 17:36)  HR: 84 (03-22-21 @ 21:35) (73 - 84)  BP: 100/60 (03-22-21 @ 21:35) (100/60 - 111/73)  RR: 18 (03-22-21 @ 21:35) (17 - 20)  SpO2: 98% (03-22-21 @ 21:35) (98% - 100%)  Wt(kg): --Vital Signs Last 24 Hrs  T(C): 36.7 (22 Mar 2021 21:35), Max: 37 (22 Mar 2021 17:36)  T(F): 98.1 (22 Mar 2021 21:35), Max: 98.6 (22 Mar 2021 17:36)  HR: 84 (22 Mar 2021 21:35) (73 - 84)  BP: 100/60 (22 Mar 2021 21:35) (100/60 - 111/73)  BP(mean): --  RR: 18 (22 Mar 2021 21:35) (17 - 20)  SpO2: 98% (22 Mar 2021 21:35) (98% - 100%)    PHYSICAL EXAM:  GENERAL: NAD, well-groomed, well-developed  NECK: Supple, No JVD  CHEST/LUNG: Clear to auscultation bilaterally; No rales, rhonchi, wheezing, or rubs  HEART: Regular rate and rhythm; No murmurs, rubs, or gallops  ABDOMEN: Soft, Nontender, Nondistended; Bowel sounds present.    EXTREMITIES:  2+ Peripheral Pulses, No clubbing, cyanosis, NO LE edema. LUE AVF  PSYCH: Alert & Oriented x3  NERVOUS SYSTEM: Motor Strength 5/5 B/L upper and lower extremities.  Sensory intact      Consultant(s) Notes Reviewed:  [x ] YES  [ ] NO  Care Discussed with Consultants/Other Providers [ x] YES  [ ] NO    LABS:                        9.4    6.50  )-----------( 250      ( 21 Mar 2021 13:13 )             29.9     03-21    143  |  102  |  54<H>  ----------------------------<  86  3.5   |  24  |  16.66<H>    Ca    9.2      21 Mar 2021 13:13    TPro  6.9  /  Alb  4.1  /  TBili  0.3  /  DBili  x   /  AST  10  /  ALT  6<L>  /  AlkPhos  53  03-21        CAPILLARY BLOOD GLUCOSE                RADIOLOGY & ADDITIONAL TESTS:    Imaging Personally Reviewed:  [ x] YES  [ ] NO
Brent Johnson MD  Division of Hospital Medicine  Pager [phone]  If no response or off hours page: [phone]  ---------------------------------------------------------    EDI ALBA  35y  Female      Patient is a 35y old  Female who presents with a chief complaint of needs HD (25 Mar 2021 12:06)      INTERVAL HPI/OVERNIGHT EVENTS:  Seen at HD. Feels fine. Happy she has a seat at outpatient HD center      REVIEW OF SYSTEMS: 10 point ROS negative unless listed above    T(C): 36.8 (03-26-21 @ 08:41), Max: 37.2 (03-25-21 @ 21:29)  HR: 89 (03-26-21 @ 08:41) (89 - 109)  BP: 86/49 (03-26-21 @ 08:41) (86/49 - 103/70)  RR: 18 (03-26-21 @ 08:41) (18 - 18)  SpO2: 98% (03-26-21 @ 08:41) (98% - 100%)  Wt(kg): --Vital Signs Last 24 Hrs  T(C): 36.8 (26 Mar 2021 08:41), Max: 37.2 (25 Mar 2021 21:29)  T(F): 98.2 (26 Mar 2021 08:41), Max: 99 (25 Mar 2021 21:29)  HR: 89 (26 Mar 2021 08:41) (89 - 109)  BP: 86/49 (26 Mar 2021 08:41) (86/49 - 103/70)  BP(mean): --  RR: 18 (26 Mar 2021 08:41) (18 - 18)  SpO2: 98% (26 Mar 2021 08:41) (98% - 100%)    PHYSICAL EXAM:  GENERAL: NAD, well-groomed, well-developed  NECK: Supple, No JVD  CHEST/LUNG: Clear to auscultation bilaterally; No rales, rhonchi, wheezing, or rubs  HEART: Regular rate and rhythm; No murmurs, rubs, or gallops  ABDOMEN: Soft, Nontender, Nondistended; Bowel sounds present.    EXTREMITIES:  2+ Peripheral Pulses, No clubbing, cyanosis, NO LE edema. RANJANAE AVF  PSYCH: Alert & Oriented x3  NERVOUS SYSTEM: Motor Strength 5/5 B/L upper and lower extremities.  Sensory intact    Consultant(s) Notes Reviewed:  [x ] YES  [ ] NO  Care Discussed with Consultants/Other Providers [ x] YES  [ ] NO    LABS:                        12.6   6.80  )-----------( 272      ( 25 Mar 2021 06:17 )             39.6     03-26    136  |  91<L>  |  44<H>  ----------------------------<  62<L>  4.3   |  21<L>  |  12.71<H>    Ca    9.9      26 Mar 2021 05:20  Mg     2.8     03-24          CAPILLARY BLOOD GLUCOSE                RADIOLOGY & ADDITIONAL TESTS:    Imaging Personally Reviewed:  [x ] YES  [ ] NO
Brent Johnson MD  Division of Hospital Medicine  Pager [phone]  If no response or off hours page: [phone]  ---------------------------------------------------------    EDI ALBA  35y  Female      Patient is a 35y old  Female who presents with a chief complaint of needs HD (24 Mar 2021 11:16)      INTERVAL HPI/OVERNIGHT EVENTS:  No overnight events. Walking [place] hallway this am. Feels fine      REVIEW OF SYSTEMS: 10 point ROS negative unless listed above    T(C): 36.9 (03-24-21 @ 06:09), Max: 36.9 (03-24-21 @ 06:09)  HR: 100 (03-24-21 @ 06:09) (100 - 106)  BP: 97/66 (03-24-21 @ 06:09) (96/60 - 97/66)  RR: 18 (03-24-21 @ 06:09) (18 - 18)  SpO2: 98% (03-24-21 @ 06:09) (97% - 98%)  Wt(kg): --Vital Signs Last 24 Hrs  T(C): 36.9 (24 Mar 2021 06:09), Max: 36.9 (24 Mar 2021 06:09)  T(F): 98.4 (24 Mar 2021 06:09), Max: 98.4 (24 Mar 2021 06:09)  HR: 100 (24 Mar 2021 06:09) (100 - 106)  BP: 97/66 (24 Mar 2021 06:09) (96/60 - 97/66)  BP(mean): --  RR: 18 (24 Mar 2021 06:09) (18 - 18)  SpO2: 98% (24 Mar 2021 06:09) (97% - 98%)    PHYSICAL EXAM:  GENERAL: NAD, well-groomed, well-developed  NECK: Supple, No JVD  CHEST/LUNG: Clear to auscultation bilaterally; No rales, rhonchi, wheezing, or rubs  HEART: Regular rate and rhythm; No murmurs, rubs, or gallops  ABDOMEN: Soft, Nontender, Nondistended; Bowel sounds present.    EXTREMITIES:  2+ Peripheral Pulses, No clubbing, cyanosis, NO LE edema. LUE AVF  PSYCH: Alert & Oriented x3  NERVOUS SYSTEM: Motor Strength 5/5 B/L upper and lower extremities.  Sensory intact    Consultant(s) Notes Reviewed:  [x ] YES  [ ] NO  Care Discussed with Consultants/Other Providers [ x] YES  [ ] NO    LABS:      No new labs        CAPILLARY BLOOD GLUCOSE                RADIOLOGY & ADDITIONAL TESTS:    Imaging Personally Reviewed:  [ ] YES  [ ] NO

## 2021-03-26 NOTE — PROGRESS NOTE BEHAVIORAL HEALTH - NSBHFUPINTERVALHXFT_PSY_A_CORE
Pt was at dialysis when seen and was feeling calm and hopeful that the new dialysis center would work out.  Patient says that her old dialysis center said they did not have a place for her and her  applied for a new place.  She says that she is hopeful that this new place will work out.  She is calm and denies any acute psychiatric symptoms. She has no thoughts of harming herself or others.
Patient says that her old dialysis center said they did not have a place for her and her  applied for a new place.  She says that she is hopeful that this new place will work out.  She is calm and denies any acute psychiatric symptoms. She has no thoughts of harming herself or others.
Pt was at dialysis when seen and was feeling calm and hopeful that she will now return to her original dialysis center where she had a problem.  She is hopeful that at least there is one staff there who is able to access her fistula and says that the staff here in the hospital have not experienced any problems.   She is calm and denies any acute psychiatric symptoms. She has no thoughts of harming herself or others.  We discussed outpt therapy and she is considering going to see Dr Sergio Bruno who does accept Medicare which she has.  She plans to request an appointment on his website.

## 2021-03-26 NOTE — PROGRESS NOTE BEHAVIORAL HEALTH - NSBHCONSULTFOLLOW_PSY_A_CORE
yes
[Fever] : fever
[Recent Weight Gain (___ Lbs)] : recent [unfilled] ~Ulb weight gain
[Sore Throat] : sore throat
[Chest Pain] : chest pain
[Palpitations] : palpitations
[Abdominal Pain] : abdominal pain
[Vomiting] : vomiting
[Constipation] : constipation
[Diarrhea] : diarrhea
[Heartburn] : heartburn
[Painful Ghent] : painful Ghent
[Loss of interest] : loss of interest in sexual activity
[Date of last menstrual period ____] : date of last menstrual period: [unfilled]
[Abnormal menstrual period, if yes explain ___] : abnormal menstrual period [unfilled]
[Other ___] : [unfilled]
[Urine Infection (bladder/kidney)] : bladder/kidney infection
[Pain during urination] : pain during urination
[Urine retention] : urine retention
[Wake up at night to urinate  How many times?  ___] : wakes up to urinate [unfilled] times during the night
[Bladder pressure] : experiences bladder pressure
[Strain or push to urinate] : strain or push to urinate
[Slow urine stream] : slow urine stream
[Interrupted urine stream] : interrupted urine stream
[Bladder fullness after urinating] : bladder fullness after urinating
[Leakage of urine with urgency] : leakage of urine with urgency
[Leakage of urine with straining, coughing, laughing] : leakage of urine with straining, coughing, laughing
[Joint Pain] : joint pain
[Joint Swelling] : joint swelling
[Itching] : itching
[Dizziness] : dizziness
[Anxiety] : anxiety
[Hot Flashes] : hot flashes
[Muscle Weakness] : muscle weakness
[Negative] : Heme/Lymph
yes
yes

## 2021-03-26 NOTE — PROGRESS NOTE ADULT - PROBLEM SELECTOR PLAN 1
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 Last HD on Tues 3/16 due to discomfort with current HD center. HD on 3/22 while hospitalized  - phoslo with meals  - midodrine prior to HD  - plan for HD as per renal  - Case management/SW consult for outpt HD arrangement. Hep panel sent. CXR pending. Psych consulted, is low risk
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 Last HD on Tues 3/16 due to discomfort with current HD center. Undergoing HD while hospitalized. Plan for HD today  - phoslo with meals  - midodrine prior to HD  - plan for HD as per renal  - Case management/SW consult for outpt HD arrangement. Has been accepted back to [facility]. Has seat on T/Th/Sat at 10:30am
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 Last HD on Tues 3/16 due to discomfort with current HD center. Undergoing HD while hospitalized. Plan for HD today  - phoslo with meals  - midodrine prior to HD  - plan for HD as per renal  - Case management/SW consult for outpt HD arrangement. Case management has sent referral out to over 7 facilities. Hep panel sent. CXR done. Psych consulted, is low risk
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 Last HD on Tues 3/16 due to discomfort with current HD center. Undergoing HD while hospitalized. Plan for HD today  - phoslo with meals  - midodrine prior to HD  - plan for HD as per renal  - Case management/SW consult for outpt HD arrangement. Hep panel sent. CXR done. Psych consulted, is low risk
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 Last HD on Tues 3/16 due to discomfort with current HD center  - phoslo with meals  - midodrine prior to HD  - plan for HD today as per renal  - Case management/SW consult for outpt HD arrangement

## 2021-03-26 NOTE — PROGRESS NOTE BEHAVIORAL HEALTH - NSBHCHARTREVIEWLAB_PSY_A_CORE FT
12.6   6.80  )-----------( 272      ( 25 Mar 2021 06:17 )             39.6   03-26    136  |  91<L>  |  44<H>  ----------------------------<  62<L>  4.3   |  21<L>  |  12.71<H>    Ca    9.9      26 Mar 2021 05:20  Mg     2.8     03-24
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
## 2021-03-26 NOTE — PROGRESS NOTE BEHAVIORAL HEALTH - RISK ASSESSMENT
pt is no an acute risk to herself or others.

## 2021-03-26 NOTE — PROGRESS NOTE ADULT - PROBLEM SELECTOR PROBLEM 3
Need for prophylactic measure
PTSD (post-traumatic stress disorder)
Need for prophylactic measure
PTSD (post-traumatic stress disorder)
PTSD (post-traumatic stress disorder)

## 2021-03-29 ENCOUNTER — NON-APPOINTMENT (OUTPATIENT)
Age: 36
End: 2021-03-29

## 2021-04-02 ENCOUNTER — APPOINTMENT (OUTPATIENT)
Dept: INTERNAL MEDICINE | Facility: CLINIC | Age: 36
End: 2021-04-02
Payer: MEDICARE

## 2021-04-02 DIAGNOSIS — F43.10 POST-TRAUMATIC STRESS DISORDER, UNSPECIFIED: ICD-10-CM

## 2021-04-02 DIAGNOSIS — R79.89 OTHER SPECIFIED ABNORMAL FINDINGS OF BLOOD CHEMISTRY: ICD-10-CM

## 2021-04-02 DIAGNOSIS — E03.9 HYPOTHYROIDISM, UNSPECIFIED: ICD-10-CM

## 2021-04-02 PROCEDURE — 99442: CPT | Mod: 95

## 2021-04-02 NOTE — ASSESSMENT
[FreeTextEntry1] : had STD test done in 2019 with gyn\par had 1st COVID 19 shot \par \par follow up in 07/2021 for CPE\par \par \par I spend a total of 15 minutes on the date of the encounter evaluating and treating patient\par

## 2021-04-02 NOTE — HEALTH RISK ASSESSMENT
[] : Yes [No] : In the past 12 months have you used drugs other than those required for medical reasons? No [No falls in past year] : Patient reported no falls in the past year [0] : 2) Feeling down, depressed, or hopeless: Not at all (0) [Patient reported PAP Smear was abnormal] : Patient reported PAP Smear was abnormal [HIV test declined] : HIV test declined [Hepatitis C test declined] : Hepatitis C test declined [None] : None [With Significant Other] : lives with significant other [Unemployed] : unemployed [Significant Other] : lives with significant other [Sexually Active] : sexually active [Feels Safe at Home] : Feels safe at home [Fully functional (bathing, dressing, toileting, transferring, walking, feeding)] : Fully functional (bathing, dressing, toileting, transferring, walking, feeding) [Fully functional (using the telephone, shopping, preparing meals, housekeeping, doing laundry, using] : Fully functional and needs no help or supervision to perform IADLs (using the telephone, shopping, preparing meals, housekeeping, doing laundry, using transportation, managing medications and managing finances) [de-identified] :  a pack every 2 weeks for 10 yrs  [[id number]] : 0 [Change in mental status noted] : No change in mental status noted [Language] : denies difficulty with language [Behavior] : denies difficulty with behavior [Learning/Retaining New Information] : denies difficulty learning/retaining new information [Handling Complex Tasks] : denies difficulty handling complex tasks [Reasoning] : denies difficulty with reasoning [Spatial Ability and Orientation] : denies difficulty with spatial ability and orientation [High Risk Behavior] : no high risk behavior [PapSmearDate] : 01/2019 [

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
## 2021-04-02 NOTE — PHYSICAL EXAM
[No Respiratory Distress] : no respiratory distress  [Speech Grossly Normal] : speech grossly normal [Memory Grossly Normal] : memory grossly normal [Normal Affect] : the affect was normal [Alert and Oriented x3] : oriented to person, place, and time [Normal Mood] : the mood was normal [Normal Insight/Judgement] : insight and judgment were intact

## 2021-04-02 NOTE — REVIEW OF SYSTEMS
[Insomnia] : insomnia [Anxiety] : anxiety [Depression] : depression [Fever] : no fever [Chills] : no chills [Fatigue] : no fatigue [Discharge] : no discharge [Pain] : no pain [Earache] : no earache [Hearing Loss] : no hearing loss [Nasal Discharge] : no nasal discharge [Sore Throat] : no sore throat [Chest Pain] : no chest pain [Palpitations] : no palpitations [Leg Claudication] : no leg claudication [Shortness Of Breath] : no shortness of breath [Wheezing] : no wheezing [Cough] : no cough [Dyspnea on Exertion] : no dyspnea on exertion [Abdominal Pain] : no abdominal pain [Nausea] : no nausea [Constipation] : no constipation [Diarrhea] : diarrhea [Vomiting] : no vomiting [Heartburn] : no heartburn [Melena] : no melena [Dysuria] : no dysuria [Incontinence] : no incontinence [Hematuria] : no hematuria [Frequency] : no frequency [Joint Pain] : no joint pain [Joint Stiffness] : no joint stiffness [Joint Swelling] : no joint swelling [Muscle Pain] : no muscle pain [Itching] : no itching [Skin Rash] : no skin rash [Headache] : no headache [Dizziness] : no dizziness [Fainting] : no fainting [Memory Loss] : no memory loss [Unsteady Walking] : no ataxia [Suicidal] : not suicidal

## 2021-04-12 ENCOUNTER — APPOINTMENT (OUTPATIENT)
Dept: OBGYN | Facility: CLINIC | Age: 36
End: 2021-04-12
Payer: MEDICARE

## 2021-04-12 ENCOUNTER — OUTPATIENT (OUTPATIENT)
Dept: OUTPATIENT SERVICES | Facility: HOSPITAL | Age: 36
LOS: 1 days | End: 2021-04-12
Payer: MEDICARE

## 2021-04-12 DIAGNOSIS — Z00.00 ENCOUNTER FOR GENERAL ADULT MEDICAL EXAMINATION WITHOUT ABNORMAL FINDINGS: ICD-10-CM

## 2021-04-12 DIAGNOSIS — Z01.419 ENCOUNTER FOR GYNECOLOGICAL EXAMINATION (GENERAL) (ROUTINE) W/OUT ABNORMAL FINDINGS: ICD-10-CM

## 2021-04-12 DIAGNOSIS — Z87.59 PERSONAL HISTORY OF OTHER COMPLICATIONS OF PREGNANCY, CHILDBIRTH AND THE PUERPERIUM: Chronic | ICD-10-CM

## 2021-04-12 DIAGNOSIS — Z87.81 PERSONAL HISTORY OF (HEALED) TRAUMATIC FRACTURE: Chronic | ICD-10-CM

## 2021-04-12 PROCEDURE — 87625 HPV TYPES 16 & 18 ONLY: CPT

## 2021-04-12 PROCEDURE — 87591 N.GONORRHOEAE DNA AMP PROB: CPT

## 2021-04-12 PROCEDURE — 87491 CHLMYD TRACH DNA AMP PROBE: CPT

## 2021-04-12 PROCEDURE — G0101: CPT

## 2021-04-12 PROCEDURE — G0463: CPT

## 2021-04-12 PROCEDURE — 87624 HPV HI-RISK TYP POOLED RSLT: CPT

## 2021-04-12 PROCEDURE — 99385 PREV VISIT NEW AGE 18-39: CPT | Mod: GY

## 2021-04-12 NOTE — HISTORY OF PRESENT ILLNESS
[Normal Amount/Duration] :  normal amount and duration [No] : Patient does not have concerns regarding sex [Previously active] : previously active [TextBox_4] : general check up, needs clearance for kidney transplant [FreeTextEntry1] : 2/5/21

## 2021-04-15 LAB
C TRACH RRNA SPEC QL NAA+PROBE: NOT DETECTED
CYTOLOGY CVX/VAG DOC THIN PREP: ABNORMAL
HPV 16 E6+E7 MRNA CVX QL NAA+PROBE: DETECTED
HPV HIGH+LOW RISK DNA PNL CVX: DETECTED
HPV18+45 E6+E7 MRNA CVX QL NAA+PROBE: NOT DETECTED
N GONORRHOEA RRNA SPEC QL NAA+PROBE: NOT DETECTED
SOURCE TP AMPLIFICATION: NORMAL

## 2021-04-19 ENCOUNTER — RESULT REVIEW (OUTPATIENT)
Age: 36
End: 2021-04-19

## 2021-04-19 ENCOUNTER — APPOINTMENT (OUTPATIENT)
Dept: ENDOVASCULAR SURGERY | Facility: CLINIC | Age: 36
End: 2021-04-19
Payer: MEDICARE

## 2021-04-19 VITALS
BODY MASS INDEX: 24.77 KG/M2 | OXYGEN SATURATION: 100 % | HEART RATE: 80 BPM | DIASTOLIC BLOOD PRESSURE: 62 MMHG | WEIGHT: 118 LBS | RESPIRATION RATE: 115 BRPM | HEIGHT: 58 IN | TEMPERATURE: 98 F | SYSTOLIC BLOOD PRESSURE: 97 MMHG

## 2021-04-19 DIAGNOSIS — T82.898A OTHER SPECIFIED COMPLICATION OF VASCULAR PROSTHETIC DEVICES, IMPLANTS AND GRAFTS, INITIAL ENCOUNTER: ICD-10-CM

## 2021-04-19 DIAGNOSIS — Z01.419 ENCOUNTER FOR GYNECOLOGICAL EXAMINATION (GENERAL) (ROUTINE) WITHOUT ABNORMAL FINDINGS: ICD-10-CM

## 2021-04-19 PROCEDURE — 76937 US GUIDE VASCULAR ACCESS: CPT

## 2021-04-19 PROCEDURE — 99072 ADDL SUPL MATRL&STAF TM PHE: CPT

## 2021-04-19 PROCEDURE — 36901Z: CUSTOM

## 2021-04-19 RX ORDER — ZOLPIDEM TARTRATE 5 MG/1
5 TABLET, FILM COATED ORAL
Refills: 0 | Status: DISCONTINUED | COMMUNITY
End: 2021-04-19

## 2021-04-19 NOTE — REASON FOR VISIT
[Other ___] : a [unfilled] visit for [Difficult Cannulation] : difficult cannulation [Pulling Clots] : pulling clots [Other: ___] : [unfilled] [Friend] : friend

## 2021-04-20 ENCOUNTER — APPOINTMENT (OUTPATIENT)
Dept: VASCULAR SURGERY | Facility: CLINIC | Age: 36
End: 2021-04-20
Payer: MEDICARE

## 2021-04-20 ENCOUNTER — APPOINTMENT (OUTPATIENT)
Dept: ENDOVASCULAR SURGERY | Facility: CLINIC | Age: 36
End: 2021-04-20

## 2021-04-20 PROCEDURE — 99213 OFFICE O/P EST LOW 20 MIN: CPT

## 2021-04-20 PROCEDURE — 99072 ADDL SUPL MATRL&STAF TM PHE: CPT

## 2021-04-20 PROCEDURE — 93990 DOPPLER FLOW TESTING: CPT

## 2021-04-22 NOTE — DISCUSSION/SUMMARY
[FreeTextEntry1] : Patient underwent a fistula duplex which shows a well-functioning fistula with adequate flow rate and no evidence of stenosis.  Fistula is superficial.  Dialysis should continue using fistula.

## 2021-04-22 NOTE — PHYSICAL EXAM
[Thrill] : thrill [Pulsatile Thrill] : no pulsatile thrill [Aneurysm] : no aneurysm [Bleeding] : no bleeding [Hand well perfused] : hand well perfused [Normal] : normoactive bowel sounds, soft and nontender, no hepatosplenomegaly or masses appreciated

## 2021-04-22 NOTE — HISTORY OF PRESENT ILLNESS
[FreeTextEntry1] : 35-year-old female currently on hemodialysis via left radiocephalic AV fistula.  Patient presents to the office today 1 day after fistulogram stating that the dialysis unit is having trouble cannulating the fistula.  She presents for a duplex [] : left radiocephalic fistula

## 2021-04-26 ENCOUNTER — APPOINTMENT (OUTPATIENT)
Dept: PSYCHIATRY | Facility: CLINIC | Age: 36
End: 2021-04-26

## 2021-04-26 NOTE — HISTORY OF PRESENT ILLNESS
[] : right internal jugular tunneled catheter [FreeTextEntry1] : 8/19/20 Dr Gomes [FreeTextEntry2] : 8-19-20 [FreeTextEntry4] : Thursday 4/15/2021 [FreeTextEntry5] : yesterday at 9 PM [FreeTextEntry6] : Dr Alonzo

## 2021-04-26 NOTE — PROCEDURE
[Resume diet] : resume diet [Site check for bleeding/hematoma/thrill/bruit] : Site check for bleeding/hematoma/thrill/bruit [Vital signs on admission the q 15 mins x2] : Vital signs on admission the q 15 mins x2 [FreeTextEntry1] : left arm fistula/fistulogram

## 2021-04-26 NOTE — ASSESSMENT
[FreeTextEntry1] : 35F ESRD, LAVF\par Difficult cannulation and possible infiltration\par For fistulagram and possible intervention.\par Informed consent obtained.

## 2021-04-27 ENCOUNTER — NON-APPOINTMENT (OUTPATIENT)
Age: 36
End: 2021-04-27

## 2021-04-29 ENCOUNTER — NON-APPOINTMENT (OUTPATIENT)
Age: 36
End: 2021-04-29

## 2021-05-03 ENCOUNTER — OUTPATIENT (OUTPATIENT)
Dept: OUTPATIENT SERVICES | Facility: HOSPITAL | Age: 36
LOS: 1 days | End: 2021-05-03
Payer: MEDICARE

## 2021-05-03 ENCOUNTER — APPOINTMENT (OUTPATIENT)
Dept: OBGYN | Facility: CLINIC | Age: 36
End: 2021-05-03
Payer: MEDICARE

## 2021-05-03 VITALS
HEART RATE: 96 BPM | BODY MASS INDEX: 24.77 KG/M2 | OXYGEN SATURATION: 100 % | SYSTOLIC BLOOD PRESSURE: 97 MMHG | HEIGHT: 58 IN | RESPIRATION RATE: 18 BRPM | TEMPERATURE: 98.5 F | WEIGHT: 118 LBS | DIASTOLIC BLOOD PRESSURE: 62 MMHG

## 2021-05-03 DIAGNOSIS — Z71.2 PERSON CONSULTING FOR EXPLANATION OF EXAMINATION OR TEST FINDINGS: ICD-10-CM

## 2021-05-03 DIAGNOSIS — Q61.3 POLYCYSTIC KIDNEY, UNSPECIFIED: ICD-10-CM

## 2021-05-03 DIAGNOSIS — Z01.818 ENCOUNTER FOR OTHER PREPROCEDURAL EXAMINATION: ICD-10-CM

## 2021-05-03 DIAGNOSIS — R87.612 LOW GRADE SQUAMOUS INTRAEPITHELIAL LESION ON CYTOLOGIC SMEAR OF CERVIX (LGSIL): ICD-10-CM

## 2021-05-03 DIAGNOSIS — N18.6 END STAGE RENAL DISEASE: ICD-10-CM

## 2021-05-03 DIAGNOSIS — Z60.2 PROBLEMS RELATED TO LIVING ALONE: ICD-10-CM

## 2021-05-03 DIAGNOSIS — B97.7 PAPILLOMAVIRUS AS THE CAUSE OF DISEASES CLASSIFIED ELSEWHERE: ICD-10-CM

## 2021-05-03 DIAGNOSIS — Z00.00 ENCOUNTER FOR GENERAL ADULT MEDICAL EXAMINATION WITHOUT ABNORMAL FINDINGS: ICD-10-CM

## 2021-05-03 DIAGNOSIS — Z87.59 PERSONAL HISTORY OF OTHER COMPLICATIONS OF PREGNANCY, CHILDBIRTH AND THE PUERPERIUM: Chronic | ICD-10-CM

## 2021-05-03 DIAGNOSIS — Z87.81 PERSONAL HISTORY OF (HEALED) TRAUMATIC FRACTURE: Chronic | ICD-10-CM

## 2021-05-03 PROCEDURE — G0463: CPT

## 2021-05-03 PROCEDURE — 99213 OFFICE O/P EST LOW 20 MIN: CPT

## 2021-05-03 RX ORDER — CALCIUM ACETATE 667 MG/1
667 CAPSULE ORAL 3 TIMES DAILY
Qty: 180 | Refills: 2 | Status: COMPLETED | COMMUNITY
Start: 2020-09-21 | End: 2021-05-03

## 2021-05-03 SDOH — SOCIAL STABILITY - SOCIAL INSECURITY: PROBLEMS RELATED TO LIVING ALONE: Z60.2

## 2021-05-03 NOTE — HISTORY OF PRESENT ILLNESS
[Patient reported PAP Smear was abnormal] : Patient reported PAP Smear was abnormal [N] : Patient is not sexually active [Y] : Positive pregnancy history [FreeTextEntry1] : Patient presents for Pap results review:\par \par Pap(04/12/21) LSIL-can't r/o HSIL / (+) #16 HR HPV - results reviewed with patient today; Hx/o abnormal Pap (2019) at [facility], patient can't recall what was the Pap results but states the Colposcopy was normal and didn't require any further management.\par \par Patient is currently on dialysis since (09/2020) for end stage renal disease, awaiting renal transplant.\par \par Not sexually active > 1 year not on contraception; hx/o ETOP x 3\par \par LMP (04/26/21) [PapSmeardate] : 4/15/21 [TextBox_31] : LSIL/HPV + [LMPDate] : 4/26/21 [PGHxTotal] : 3 [PGHxFullTerm] : 0 [PGHxPremature] : 0 [PGHxAbortions] : 3 [[facility]] : 0 [Previously active] : previously active

## 2021-05-10 ENCOUNTER — APPOINTMENT (OUTPATIENT)
Dept: OBGYN | Facility: CLINIC | Age: 36
End: 2021-05-10

## 2021-05-10 DIAGNOSIS — Z71.2 PERSON CONSULTING FOR EXPLANATION OF EXAMINATION OR TEST FINDINGS: ICD-10-CM

## 2021-05-10 DIAGNOSIS — N18.6 END STAGE RENAL DISEASE: ICD-10-CM

## 2021-05-10 DIAGNOSIS — Q61.3 POLYCYSTIC KIDNEY, UNSPECIFIED: ICD-10-CM

## 2021-05-10 DIAGNOSIS — B97.7 PAPILLOMAVIRUS AS THE CAUSE OF DISEASES CLASSIFIED ELSEWHERE: ICD-10-CM

## 2021-05-10 DIAGNOSIS — R87.612 LOW GRADE SQUAMOUS INTRAEPITHELIAL LESION ON CYTOLOGIC SMEAR OF CERVIX (LGSIL): ICD-10-CM

## 2021-05-10 DIAGNOSIS — Z60.2 PROBLEMS RELATED TO LIVING ALONE: ICD-10-CM

## 2021-05-10 SDOH — SOCIAL STABILITY - SOCIAL INSECURITY: PROBLEMS RELATED TO LIVING ALONE: Z60.2

## 2021-06-16 ENCOUNTER — NON-APPOINTMENT (OUTPATIENT)
Age: 36
End: 2021-06-16

## 2021-07-15 ENCOUNTER — APPOINTMENT (OUTPATIENT)
Dept: VASCULAR SURGERY | Facility: CLINIC | Age: 36
End: 2021-07-15

## 2021-07-15 ENCOUNTER — NON-APPOINTMENT (OUTPATIENT)
Age: 36
End: 2021-07-15

## 2021-07-16 ENCOUNTER — NON-APPOINTMENT (OUTPATIENT)
Age: 36
End: 2021-07-16

## 2022-09-13 NOTE — ED ADULT TRIAGE NOTE - CHIEF COMPLAINT QUOTE
[Negative] : Heme/Lymph "I need to be dialized", last dialysis on Tuesday. Was recently transferred and unable to be dialized.

## 2022-11-11 NOTE — PROGRESS NOTE ADULT - PROBLEM SELECTOR PROBLEM 2
Case Management Discharge Note      Final Note: [facility] skilled via [employer] stretcher    Provided Post Acute Provider List?: Yes  Post Acute Provider List: Nursing Home    Selected Continued Care - Admitted Since 11/3/2022     Destination Coordination complete.    Service Provider Selected Services Address Phone Fax Patient Preferred    [facility] [address] [place] [address] [phone] --          Durable Medical Equipment    No services have been selected for the patient.              Dialysis/Infusion    No services have been selected for the patient.              Home Medical Care    No services have been selected for the patient.              Therapy    No services have been selected for the patient.              Community Resources    No services have been selected for the patient.              Community & DME    No services have been selected for the patient.                  Transportation Services  [facility]    Final Discharge Disposition Code: 03 - skilled nursing facility (SNF)   Metabolic acidosis